# Patient Record
Sex: MALE | Race: WHITE | Employment: FULL TIME | URBAN - NONMETROPOLITAN AREA
[De-identification: names, ages, dates, MRNs, and addresses within clinical notes are randomized per-mention and may not be internally consistent; named-entity substitution may affect disease eponyms.]

---

## 2019-08-20 ENCOUNTER — OFFICE VISIT (OUTPATIENT)
Dept: FAMILY MEDICINE CLINIC | Age: 69
End: 2019-08-20
Payer: COMMERCIAL

## 2019-08-20 VITALS
SYSTOLIC BLOOD PRESSURE: 130 MMHG | DIASTOLIC BLOOD PRESSURE: 74 MMHG | HEART RATE: 88 BPM | BODY MASS INDEX: 23.79 KG/M2 | RESPIRATION RATE: 20 BRPM | TEMPERATURE: 97.8 F | WEIGHT: 157 LBS | HEIGHT: 68 IN | OXYGEN SATURATION: 98 %

## 2019-08-20 DIAGNOSIS — Z00.00 ANNUAL PHYSICAL EXAM: ICD-10-CM

## 2019-08-20 DIAGNOSIS — F17.210 CIGARETTE NICOTINE DEPENDENCE, UNCOMPLICATED: ICD-10-CM

## 2019-08-20 DIAGNOSIS — R53.83 FATIGUE, UNSPECIFIED TYPE: ICD-10-CM

## 2019-08-20 DIAGNOSIS — R73.09 ELEVATED GLUCOSE: ICD-10-CM

## 2019-08-20 DIAGNOSIS — Z12.5 SCREENING FOR PROSTATE CANCER: ICD-10-CM

## 2019-08-20 DIAGNOSIS — N52.9 ERECTILE DYSFUNCTION, UNSPECIFIED ERECTILE DYSFUNCTION TYPE: ICD-10-CM

## 2019-08-20 DIAGNOSIS — K40.20 NON-RECURRENT BILATERAL INGUINAL HERNIA WITHOUT OBSTRUCTION OR GANGRENE: Primary | ICD-10-CM

## 2019-08-20 DIAGNOSIS — Z87.891 PERSONAL HISTORY OF TOBACCO USE: ICD-10-CM

## 2019-08-20 PROCEDURE — 4040F PNEUMOC VAC/ADMIN/RCVD: CPT | Performed by: NURSE PRACTITIONER

## 2019-08-20 PROCEDURE — G0296 VISIT TO DETERM LDCT ELIG: HCPCS | Performed by: NURSE PRACTITIONER

## 2019-08-20 PROCEDURE — G8427 DOCREV CUR MEDS BY ELIG CLIN: HCPCS | Performed by: NURSE PRACTITIONER

## 2019-08-20 PROCEDURE — G8420 CALC BMI NORM PARAMETERS: HCPCS | Performed by: NURSE PRACTITIONER

## 2019-08-20 PROCEDURE — 99214 OFFICE O/P EST MOD 30 MIN: CPT | Performed by: NURSE PRACTITIONER

## 2019-08-20 PROCEDURE — 4004F PT TOBACCO SCREEN RCVD TLK: CPT | Performed by: NURSE PRACTITIONER

## 2019-08-20 PROCEDURE — 3017F COLORECTAL CA SCREEN DOC REV: CPT | Performed by: NURSE PRACTITIONER

## 2019-08-20 PROCEDURE — 1123F ACP DISCUSS/DSCN MKR DOCD: CPT | Performed by: NURSE PRACTITIONER

## 2019-08-20 RX ORDER — SILDENAFIL CITRATE 20 MG/1
20 TABLET ORAL DAILY PRN
Qty: 30 TABLET | Refills: 5 | Status: SHIPPED | OUTPATIENT
Start: 2019-08-20 | End: 2019-09-19

## 2019-08-20 ASSESSMENT — PATIENT HEALTH QUESTIONNAIRE - PHQ9
2. FEELING DOWN, DEPRESSED OR HOPELESS: 0
SUM OF ALL RESPONSES TO PHQ9 QUESTIONS 1 & 2: 0
1. LITTLE INTEREST OR PLEASURE IN DOING THINGS: 0
SUM OF ALL RESPONSES TO PHQ QUESTIONS 1-9: 0
SUM OF ALL RESPONSES TO PHQ QUESTIONS 1-9: 0

## 2019-08-20 ASSESSMENT — ENCOUNTER SYMPTOMS
SHORTNESS OF BREATH: 0
TROUBLE SWALLOWING: 0
VOICE CHANGE: 0
BLOOD IN STOOL: 0
COUGH: 1
ABDOMINAL PAIN: 0

## 2019-08-21 DIAGNOSIS — Z12.5 SCREENING FOR PROSTATE CANCER: ICD-10-CM

## 2019-08-21 DIAGNOSIS — Z00.00 ANNUAL PHYSICAL EXAM: ICD-10-CM

## 2019-08-21 DIAGNOSIS — R53.83 FATIGUE, UNSPECIFIED TYPE: ICD-10-CM

## 2019-08-21 LAB
ALBUMIN SERPL-MCNC: 4.3 G/DL (ref 3.5–5.2)
ALP BLD-CCNC: 66 U/L (ref 40–130)
ALT SERPL-CCNC: 14 U/L (ref 5–41)
ANION GAP SERPL CALCULATED.3IONS-SCNC: 14 MMOL/L (ref 7–19)
AST SERPL-CCNC: 19 U/L (ref 5–40)
BASOPHILS ABSOLUTE: 0.1 K/UL (ref 0–0.2)
BASOPHILS RELATIVE PERCENT: 1 % (ref 0–1)
BILIRUB SERPL-MCNC: 0.4 MG/DL (ref 0.2–1.2)
BUN BLDV-MCNC: 13 MG/DL (ref 8–23)
CALCIUM SERPL-MCNC: 10.9 MG/DL (ref 8.8–10.2)
CHLORIDE BLD-SCNC: 105 MMOL/L (ref 98–111)
CHOLESTEROL, TOTAL: 196 MG/DL (ref 160–199)
CO2: 20 MMOL/L (ref 22–29)
CREAT SERPL-MCNC: 0.9 MG/DL (ref 0.5–1.2)
EOSINOPHILS ABSOLUTE: 0.1 K/UL (ref 0–0.6)
EOSINOPHILS RELATIVE PERCENT: 2.2 % (ref 0–5)
GFR NON-AFRICAN AMERICAN: >60
GLUCOSE BLD-MCNC: 122 MG/DL (ref 74–109)
HCT VFR BLD CALC: 45.2 % (ref 42–52)
HDLC SERPL-MCNC: 41 MG/DL (ref 55–121)
HEMOGLOBIN: 14.6 G/DL (ref 14–18)
IMMATURE GRANULOCYTES #: 0 K/UL
LDL CHOLESTEROL CALCULATED: 136 MG/DL
LYMPHOCYTES ABSOLUTE: 1.6 K/UL (ref 1.1–4.5)
LYMPHOCYTES RELATIVE PERCENT: 25.2 % (ref 20–40)
MCH RBC QN AUTO: 30.8 PG (ref 27–31)
MCHC RBC AUTO-ENTMCNC: 32.3 G/DL (ref 33–37)
MCV RBC AUTO: 95.4 FL (ref 80–94)
MONOCYTES ABSOLUTE: 0.6 K/UL (ref 0–0.9)
MONOCYTES RELATIVE PERCENT: 8.9 % (ref 0–10)
NEUTROPHILS ABSOLUTE: 3.9 K/UL (ref 1.5–7.5)
NEUTROPHILS RELATIVE PERCENT: 62.5 % (ref 50–65)
PDW BLD-RTO: 13.9 % (ref 11.5–14.5)
PLATELET # BLD: 202 K/UL (ref 130–400)
PMV BLD AUTO: 9.6 FL (ref 9.4–12.4)
POTASSIUM SERPL-SCNC: 4.2 MMOL/L (ref 3.5–5)
PROSTATE SPECIFIC ANTIGEN: 3.37 NG/ML (ref 0–4)
RBC # BLD: 4.74 M/UL (ref 4.7–6.1)
SODIUM BLD-SCNC: 139 MMOL/L (ref 136–145)
T4 FREE: 1.3 NG/DL (ref 0.9–1.7)
TOTAL PROTEIN: 7.3 G/DL (ref 6.6–8.7)
TRIGL SERPL-MCNC: 93 MG/DL (ref 0–149)
TSH SERPL DL<=0.05 MIU/L-ACNC: 1.03 UIU/ML (ref 0.27–4.2)
WBC # BLD: 6.3 K/UL (ref 4.8–10.8)

## 2019-09-03 ENCOUNTER — OFFICE VISIT (OUTPATIENT)
Dept: SURGERY | Age: 69
End: 2019-09-03
Payer: MEDICARE

## 2019-09-03 VITALS
TEMPERATURE: 97.2 F | SYSTOLIC BLOOD PRESSURE: 130 MMHG | HEIGHT: 68 IN | BODY MASS INDEX: 23.52 KG/M2 | DIASTOLIC BLOOD PRESSURE: 78 MMHG | WEIGHT: 155.2 LBS

## 2019-09-03 DIAGNOSIS — K40.20 NON-RECURRENT BILATERAL INGUINAL HERNIA WITHOUT OBSTRUCTION OR GANGRENE: Primary | ICD-10-CM

## 2019-09-03 PROCEDURE — G8427 DOCREV CUR MEDS BY ELIG CLIN: HCPCS | Performed by: SURGERY

## 2019-09-03 PROCEDURE — G8420 CALC BMI NORM PARAMETERS: HCPCS | Performed by: SURGERY

## 2019-09-03 PROCEDURE — 4004F PT TOBACCO SCREEN RCVD TLK: CPT | Performed by: SURGERY

## 2019-09-03 PROCEDURE — 4040F PNEUMOC VAC/ADMIN/RCVD: CPT | Performed by: SURGERY

## 2019-09-03 PROCEDURE — 1123F ACP DISCUSS/DSCN MKR DOCD: CPT | Performed by: SURGERY

## 2019-09-03 PROCEDURE — 99202 OFFICE O/P NEW SF 15 MIN: CPT | Performed by: SURGERY

## 2019-09-03 PROCEDURE — 3017F COLORECTAL CA SCREEN DOC REV: CPT | Performed by: SURGERY

## 2019-09-05 ENCOUNTER — PREP FOR PROCEDURE (OUTPATIENT)
Dept: SURGERY | Age: 69
End: 2019-09-05

## 2019-09-05 ENCOUNTER — HOSPITAL ENCOUNTER (OUTPATIENT)
Dept: GENERAL RADIOLOGY | Age: 69
Discharge: HOME | End: 2019-09-05
Payer: COMMERCIAL

## 2019-09-05 ENCOUNTER — ANESTHESIA EVENT (OUTPATIENT)
Dept: OPERATING ROOM | Age: 69
End: 2019-09-05

## 2019-09-05 DIAGNOSIS — Z87.891 PERSONAL HISTORY OF TOBACCO USE: ICD-10-CM

## 2019-09-05 PROCEDURE — G0297 LDCT FOR LUNG CA SCREEN: HCPCS

## 2019-09-05 RX ORDER — SODIUM CHLORIDE 0.9 % (FLUSH) 0.9 %
10 SYRINGE (ML) INJECTION PRN
Status: CANCELLED | OUTPATIENT
Start: 2019-09-05

## 2019-09-05 RX ORDER — SODIUM CHLORIDE 0.9 % (FLUSH) 0.9 %
10 SYRINGE (ML) INJECTION EVERY 12 HOURS SCHEDULED
Status: CANCELLED | OUTPATIENT
Start: 2019-09-05

## 2019-09-05 RX ORDER — SODIUM CHLORIDE, SODIUM LACTATE, POTASSIUM CHLORIDE, CALCIUM CHLORIDE 600; 310; 30; 20 MG/100ML; MG/100ML; MG/100ML; MG/100ML
INJECTION, SOLUTION INTRAVENOUS CONTINUOUS
Status: CANCELLED | OUTPATIENT
Start: 2019-09-05

## 2019-09-05 ASSESSMENT — ENCOUNTER SYMPTOMS
NAUSEA: 0
SORE THROAT: 0
TROUBLE SWALLOWING: 0
ABDOMINAL DISTENTION: 0
SINUS PRESSURE: 0
WHEEZING: 0
VOMITING: 0
SHORTNESS OF BREATH: 0
BACK PAIN: 0
COLOR CHANGE: 0
COUGH: 1
CONSTIPATION: 0
CHEST TIGHTNESS: 0
ABDOMINAL PAIN: 0
DIARRHEA: 0

## 2019-09-11 ENCOUNTER — HOSPITAL ENCOUNTER (OUTPATIENT)
Age: 69
Setting detail: OUTPATIENT SURGERY
Discharge: HOME | End: 2019-09-11
Attending: SURGERY | Admitting: SURGERY
Payer: MEDICARE

## 2019-09-11 ENCOUNTER — ANESTHESIA (OUTPATIENT)
Dept: OPERATING ROOM | Age: 69
End: 2019-09-11

## 2019-09-11 VITALS
SYSTOLIC BLOOD PRESSURE: 134 MMHG | WEIGHT: 155 LBS | HEART RATE: 72 BPM | BODY MASS INDEX: 23.49 KG/M2 | OXYGEN SATURATION: 92 % | DIASTOLIC BLOOD PRESSURE: 72 MMHG | TEMPERATURE: 97.5 F | RESPIRATION RATE: 18 BRPM | HEIGHT: 68 IN

## 2019-09-11 VITALS
OXYGEN SATURATION: 95 % | SYSTOLIC BLOOD PRESSURE: 145 MMHG | DIASTOLIC BLOOD PRESSURE: 82 MMHG | RESPIRATION RATE: 6 BRPM

## 2019-09-11 DIAGNOSIS — K40.20 NON-RECURRENT BILATERAL INGUINAL HERNIA WITHOUT OBSTRUCTION OR GANGRENE: Primary | ICD-10-CM

## 2019-09-11 PROCEDURE — 49650 LAP ING HERNIA REPAIR INIT: CPT | Performed by: SURGERY

## 2019-09-11 PROCEDURE — G8918 PT W/O PREOP ORDER IV AB PRO: HCPCS

## 2019-09-11 PROCEDURE — G8916 PT W IV AB GIVEN ON TIME: HCPCS

## 2019-09-11 PROCEDURE — G8907 PT DOC NO EVENTS ON DISCHARG: HCPCS

## 2019-09-11 PROCEDURE — 49650 LAP ING HERNIA REPAIR INIT: CPT

## 2019-09-11 PROCEDURE — C1781 MESH (IMPLANTABLE): HCPCS | Performed by: SURGERY

## 2019-09-11 DEVICE — MESH HERN 15X15CM MACROPOROUS ULTRAPRO ADV: Type: IMPLANTABLE DEVICE | Site: ABDOMEN | Status: FUNCTIONAL

## 2019-09-11 RX ORDER — OXYCODONE HYDROCHLORIDE AND ACETAMINOPHEN 5; 325 MG/1; MG/1
1 TABLET ORAL PRN
Status: DISCONTINUED | OUTPATIENT
Start: 2019-09-11 | End: 2019-09-11 | Stop reason: HOSPADM

## 2019-09-11 RX ORDER — SODIUM CHLORIDE, SODIUM LACTATE, POTASSIUM CHLORIDE, CALCIUM CHLORIDE 600; 310; 30; 20 MG/100ML; MG/100ML; MG/100ML; MG/100ML
INJECTION, SOLUTION INTRAVENOUS CONTINUOUS
Status: DISCONTINUED | OUTPATIENT
Start: 2019-09-11 | End: 2019-09-11 | Stop reason: HOSPADM

## 2019-09-11 RX ORDER — OXYCODONE HYDROCHLORIDE AND ACETAMINOPHEN 5; 325 MG/1; MG/1
2 TABLET ORAL PRN
Status: DISCONTINUED | OUTPATIENT
Start: 2019-09-11 | End: 2019-09-11 | Stop reason: HOSPADM

## 2019-09-11 RX ORDER — HYDROCODONE BITARTRATE AND ACETAMINOPHEN 5; 325 MG/1; MG/1
1 TABLET ORAL EVERY 4 HOURS PRN
Qty: 25 TABLET | Refills: 0 | Status: SHIPPED | OUTPATIENT
Start: 2019-09-11 | End: 2019-09-14

## 2019-09-11 RX ORDER — BUPIVACAINE HYDROCHLORIDE 2.5 MG/ML
INJECTION, SOLUTION EPIDURAL; INFILTRATION; INTRACAUDAL PRN
Status: DISCONTINUED | OUTPATIENT
Start: 2019-09-11 | End: 2019-09-11 | Stop reason: ALTCHOICE

## 2019-09-11 RX ORDER — PROMETHAZINE HYDROCHLORIDE 25 MG/ML
12.5 INJECTION, SOLUTION INTRAMUSCULAR; INTRAVENOUS
Status: DISCONTINUED | OUTPATIENT
Start: 2019-09-11 | End: 2019-09-11 | Stop reason: HOSPADM

## 2019-09-11 RX ORDER — HYDROMORPHONE HCL 110MG/55ML
PATIENT CONTROLLED ANALGESIA SYRINGE INTRAVENOUS PRN
Status: DISCONTINUED | OUTPATIENT
Start: 2019-09-11 | End: 2019-09-11 | Stop reason: SDUPTHER

## 2019-09-11 RX ORDER — HYDROMORPHONE HCL 110MG/55ML
0.25 PATIENT CONTROLLED ANALGESIA SYRINGE INTRAVENOUS EVERY 5 MIN PRN
Status: DISCONTINUED | OUTPATIENT
Start: 2019-09-11 | End: 2019-09-11 | Stop reason: HOSPADM

## 2019-09-11 RX ORDER — SODIUM CHLORIDE 0.9 % (FLUSH) 0.9 %
10 SYRINGE (ML) INJECTION PRN
Status: DISCONTINUED | OUTPATIENT
Start: 2019-09-11 | End: 2019-09-11 | Stop reason: HOSPADM

## 2019-09-11 RX ORDER — HYDRALAZINE HYDROCHLORIDE 20 MG/ML
5 INJECTION INTRAMUSCULAR; INTRAVENOUS EVERY 10 MIN PRN
Status: DISCONTINUED | OUTPATIENT
Start: 2019-09-11 | End: 2019-09-11 | Stop reason: HOSPADM

## 2019-09-11 RX ORDER — DEXAMETHASONE SODIUM PHOSPHATE 4 MG/ML
INJECTION, SOLUTION INTRA-ARTICULAR; INTRALESIONAL; INTRAMUSCULAR; INTRAVENOUS; SOFT TISSUE PRN
Status: DISCONTINUED | OUTPATIENT
Start: 2019-09-11 | End: 2019-09-11 | Stop reason: SDUPTHER

## 2019-09-11 RX ORDER — ONDANSETRON 2 MG/ML
4 INJECTION INTRAMUSCULAR; INTRAVENOUS
Status: DISCONTINUED | OUTPATIENT
Start: 2019-09-11 | End: 2019-09-11 | Stop reason: HOSPADM

## 2019-09-11 RX ORDER — ONDANSETRON 2 MG/ML
INJECTION INTRAMUSCULAR; INTRAVENOUS PRN
Status: DISCONTINUED | OUTPATIENT
Start: 2019-09-11 | End: 2019-09-11 | Stop reason: SDUPTHER

## 2019-09-11 RX ORDER — HYDROMORPHONE HCL 110MG/55ML
0.5 PATIENT CONTROLLED ANALGESIA SYRINGE INTRAVENOUS EVERY 5 MIN PRN
Status: DISCONTINUED | OUTPATIENT
Start: 2019-09-11 | End: 2019-09-11 | Stop reason: HOSPADM

## 2019-09-11 RX ORDER — HYDROCODONE BITARTRATE AND ACETAMINOPHEN 5; 325 MG/1; MG/1
2 TABLET ORAL PRN
Status: COMPLETED | OUTPATIENT
Start: 2019-09-11 | End: 2019-09-11

## 2019-09-11 RX ORDER — FENTANYL CITRATE 50 UG/ML
50 INJECTION, SOLUTION INTRAMUSCULAR; INTRAVENOUS EVERY 5 MIN PRN
Status: DISCONTINUED | OUTPATIENT
Start: 2019-09-11 | End: 2019-09-11 | Stop reason: HOSPADM

## 2019-09-11 RX ORDER — LABETALOL HYDROCHLORIDE 5 MG/ML
5 INJECTION, SOLUTION INTRAVENOUS EVERY 10 MIN PRN
Status: DISCONTINUED | OUTPATIENT
Start: 2019-09-11 | End: 2019-09-11 | Stop reason: HOSPADM

## 2019-09-11 RX ORDER — LIDOCAINE HYDROCHLORIDE 10 MG/ML
INJECTION, SOLUTION INFILTRATION; PERINEURAL PRN
Status: DISCONTINUED | OUTPATIENT
Start: 2019-09-11 | End: 2019-09-11 | Stop reason: SDUPTHER

## 2019-09-11 RX ORDER — MEPERIDINE HYDROCHLORIDE 25 MG/ML
12.5 INJECTION INTRAMUSCULAR; INTRAVENOUS; SUBCUTANEOUS EVERY 5 MIN PRN
Status: DISCONTINUED | OUTPATIENT
Start: 2019-09-11 | End: 2019-09-11 | Stop reason: HOSPADM

## 2019-09-11 RX ORDER — FENTANYL CITRATE 50 UG/ML
INJECTION, SOLUTION INTRAMUSCULAR; INTRAVENOUS PRN
Status: DISCONTINUED | OUTPATIENT
Start: 2019-09-11 | End: 2019-09-11 | Stop reason: SDUPTHER

## 2019-09-11 RX ORDER — HYDROCODONE BITARTRATE AND ACETAMINOPHEN 5; 325 MG/1; MG/1
1 TABLET ORAL PRN
Status: COMPLETED | OUTPATIENT
Start: 2019-09-11 | End: 2019-09-11

## 2019-09-11 RX ORDER — DIPHENHYDRAMINE HYDROCHLORIDE 50 MG/ML
12.5 INJECTION INTRAMUSCULAR; INTRAVENOUS
Status: DISCONTINUED | OUTPATIENT
Start: 2019-09-11 | End: 2019-09-11 | Stop reason: HOSPADM

## 2019-09-11 RX ORDER — SODIUM CHLORIDE 0.9 % (FLUSH) 0.9 %
10 SYRINGE (ML) INJECTION EVERY 12 HOURS SCHEDULED
Status: DISCONTINUED | OUTPATIENT
Start: 2019-09-11 | End: 2019-09-11 | Stop reason: HOSPADM

## 2019-09-11 RX ORDER — ROCURONIUM BROMIDE 10 MG/ML
INJECTION, SOLUTION INTRAVENOUS PRN
Status: DISCONTINUED | OUTPATIENT
Start: 2019-09-11 | End: 2019-09-11 | Stop reason: SDUPTHER

## 2019-09-11 RX ORDER — KETOROLAC TROMETHAMINE 30 MG/ML
INJECTION, SOLUTION INTRAMUSCULAR; INTRAVENOUS PRN
Status: DISCONTINUED | OUTPATIENT
Start: 2019-09-11 | End: 2019-09-11 | Stop reason: SDUPTHER

## 2019-09-11 RX ORDER — PROPOFOL 10 MG/ML
INJECTION, EMULSION INTRAVENOUS PRN
Status: DISCONTINUED | OUTPATIENT
Start: 2019-09-11 | End: 2019-09-11 | Stop reason: SDUPTHER

## 2019-09-11 RX ADMIN — LIDOCAINE HYDROCHLORIDE 40 MG: 10 INJECTION, SOLUTION INFILTRATION; PERINEURAL at 12:01

## 2019-09-11 RX ADMIN — PROPOFOL 100 MG: 10 INJECTION, EMULSION INTRAVENOUS at 12:02

## 2019-09-11 RX ADMIN — KETOROLAC TROMETHAMINE 30 MG: 30 INJECTION, SOLUTION INTRAMUSCULAR; INTRAVENOUS at 13:16

## 2019-09-11 RX ADMIN — SODIUM CHLORIDE, SODIUM LACTATE, POTASSIUM CHLORIDE, CALCIUM CHLORIDE: 600; 310; 30; 20 INJECTION, SOLUTION INTRAVENOUS at 09:26

## 2019-09-11 RX ADMIN — ROCURONIUM BROMIDE 40 MG: 10 INJECTION, SOLUTION INTRAVENOUS at 12:02

## 2019-09-11 RX ADMIN — DEXAMETHASONE SODIUM PHOSPHATE 4 MG: 4 INJECTION, SOLUTION INTRA-ARTICULAR; INTRALESIONAL; INTRAMUSCULAR; INTRAVENOUS; SOFT TISSUE at 12:39

## 2019-09-11 RX ADMIN — FENTANYL CITRATE 50 MCG: 50 INJECTION, SOLUTION INTRAMUSCULAR; INTRAVENOUS at 12:06

## 2019-09-11 RX ADMIN — KETOROLAC TROMETHAMINE 30 MG: 30 INJECTION, SOLUTION INTRAMUSCULAR; INTRAVENOUS at 13:04

## 2019-09-11 RX ADMIN — HYDROCODONE BITARTRATE AND ACETAMINOPHEN 1 TABLET: 5; 325 TABLET ORAL at 14:15

## 2019-09-11 RX ADMIN — SODIUM CHLORIDE, SODIUM LACTATE, POTASSIUM CHLORIDE, CALCIUM CHLORIDE: 600; 310; 30; 20 INJECTION, SOLUTION INTRAVENOUS at 13:10

## 2019-09-11 RX ADMIN — ONDANSETRON 4 MG: 2 INJECTION INTRAMUSCULAR; INTRAVENOUS at 12:39

## 2019-09-11 RX ADMIN — FENTANYL CITRATE 50 MCG: 50 INJECTION, SOLUTION INTRAMUSCULAR; INTRAVENOUS at 12:37

## 2019-09-11 RX ADMIN — Medication 0.25 MG: at 13:21

## 2019-09-11 RX ADMIN — PROPOFOL 80 MG: 10 INJECTION, EMULSION INTRAVENOUS at 12:01

## 2019-09-11 ASSESSMENT — PAIN DESCRIPTION - LOCATION: LOCATION: ABDOMEN

## 2019-09-11 ASSESSMENT — PAIN DESCRIPTION - DESCRIPTORS: DESCRIPTORS: SHARP

## 2019-09-11 ASSESSMENT — PAIN DESCRIPTION - ORIENTATION: ORIENTATION: LOWER

## 2019-09-11 ASSESSMENT — PAIN SCALES - GENERAL
PAINLEVEL_OUTOF10: 4
PAINLEVEL_OUTOF10: 8
PAINLEVEL_OUTOF10: 1
PAINLEVEL_OUTOF10: 1

## 2019-09-11 ASSESSMENT — PAIN DESCRIPTION - PAIN TYPE: TYPE: SURGICAL PAIN

## 2019-09-11 ASSESSMENT — PAIN DESCRIPTION - PROGRESSION: CLINICAL_PROGRESSION: GRADUALLY IMPROVING

## 2019-09-11 ASSESSMENT — PAIN - FUNCTIONAL ASSESSMENT: PAIN_FUNCTIONAL_ASSESSMENT: ACTIVITIES ARE NOT PREVENTED

## 2019-09-11 ASSESSMENT — LIFESTYLE VARIABLES: SMOKING_STATUS: 1

## 2019-09-11 NOTE — ANESTHESIA POSTPROCEDURE EVALUATION
Department of Anesthesiology  Postprocedure Note    Patient: Val Wright  MRN: [id number]  YOB: 1950  Date of evaluation: 9/11/2019  Time:  12:43 PM     Procedure Summary     Date:  09/11/19 Room / Location:  [facility] OR  / [facility] OR    Anesthesia Start:  9642 Anesthesia Stop:      Procedure:  LAPAROSCOPIC BILATERAL INGUINAL HERNIA REPAIR WITH MESH (Bilateral Abdomen) Diagnosis:  (BILATERAL INGUINAL HERNIAS)    Surgeon:  Maxine Brush MD Responsible Provider: LIMA Gates CRNA    Anesthesia Type:  general ASA Status:  2          Anesthesia Type: general    Enzo Phase I:      Enzo Phase II:      Last vitals: Reviewed and per EMR flowsheets.        Anesthesia Post Evaluation    Patient location during evaluation: PACU  Patient participation: complete - patient participated  Level of consciousness: responsive to verbal stimuli  Airway patency: patent  Nausea & Vomiting: no nausea  Complications: no  Cardiovascular status: blood pressure returned to baseline  Respiratory status: face mask and spontaneous ventilation  Hydration status: euvolemic

## 2019-09-11 NOTE — OP NOTE
inspected with a large direct inguinal hernia noted. I went out laterally and took down some filmy attachments and identified the  peritoneum as it ran onto the cord, where it stopped before forming an   indirect sac. The peritoneum was elevated from the underlying cord   structures and dissected well back in the retroperitoneum. I then approached the left side and in similar fashion, Medardo's ligament was  cleared and the direct space inspected and found to contain a medium sized  hernia. We went out laterally and took down filmy attachments and again  identified the peritoneum as it ran onto the cord, where it again stop short   of forming an indirect sac. This was again dissected from the underlying   structures and the dissection carried well back in the retroperitoneum. I elected a repair using Ultrapro mesh. A 6 x 6 inch piece was delivered onto  the field and cut into two 3 x 6 inch pieces. The first was wrapped around  the [facility] and advanced through the camera port. It was then  grabbed in the pre-peritoneal space, opened and oriented across the left  groin in standard fashion. When I was satisfied with the positioning, I  secured it in place with the [address]. The initial tack was taken  at the medial superior corner. I then tacked along the superior edge, taking  care to avoid the inferior epigastric vessels and terminated tacking at the  lateral superior corner. I then placed additional tacks along Medardo's ligament  down to the level of the obturator vein. This left the mesh in excellent  position with the direct and indirect spaces nicely covered. I brought the second piece of mesh into the abdomen and oriented it across  the right groin. I secured it in similar fashion to that described for the  left. I was again quite satisfied with coverage over the direct and indirect  spaces. The operative site was then checked and hemostasis assured.  We vented CO2  and watched as the peritoneum descended back into the pelvis, coming to lie  On top of the mesh in appropriate fashion. The working ports were then removed. No bleeding noted. The structural port  was deflated and removed as well. Fascia at the umbilical incision was  closed with two figure-of-eight 0 Vicryl sutures. Skin incisions were closed  with interrupted 3-0 Vicryl subcuticular suture, followed by Dermabond  dressing. Sponge, needle and instrument count correct on 2 occasions. Estimated intraoperative blood loss minimal.     Mr. Roberto Laboy tolerated his surgery well, and he was taken to recovery in satisfactory   condition.            ________________________________  Gonzalo Wray MD

## 2019-09-11 NOTE — DISCHARGE INSTR - ACTIVITY
Up as desired. Gradually resume light activity. 10 pound limit on lifting. May shower or bathe as desired. May resume driving when no longer taking narcotic pain medications.

## 2019-09-11 NOTE — INTERVAL H&P NOTE
H&P Update    Patient's History and Physical was reviewed. Patient examined. There has been no change.     Electronically signed by Davy Brewer MD on 9/11/19 at 11:41 AM

## 2019-09-11 NOTE — DISCHARGE INSTR - DIET
 Good nutrition is important when healing from an illness, injury, or surgery. Follow any nutrition recommendations given to you during your hospital stay. You may eat a regular diet today but you want to start with non spicy soft foods and advance as tolerated. Drink lots of fluid today to rehydrate yourself (anything non alcoholic will do but water is always best.)  If you get nauseated while eating stop with food and continue with fluid for a while. Resume your usual diet as desired.

## 2019-09-18 ENCOUNTER — TELEPHONE (OUTPATIENT)
Dept: SURGERY | Age: 69
End: 2019-09-18

## 2019-09-18 NOTE — TELEPHONE ENCOUNTER
Called patient. ..he was at work. He states he has a bulge like he had before surgery. Appointment made for patient to see upad tomorrow for concerns.

## 2019-09-19 ENCOUNTER — OFFICE VISIT (OUTPATIENT)
Dept: SURGERY | Age: 69
End: 2019-09-19

## 2019-09-19 VITALS
DIASTOLIC BLOOD PRESSURE: 70 MMHG | HEIGHT: 68 IN | WEIGHT: 155 LBS | BODY MASS INDEX: 23.49 KG/M2 | SYSTOLIC BLOOD PRESSURE: 106 MMHG

## 2019-09-19 DIAGNOSIS — Z87.19 S/P HERNIA SURGERY: Primary | ICD-10-CM

## 2019-09-19 DIAGNOSIS — Z98.890 S/P HERNIA SURGERY: Primary | ICD-10-CM

## 2019-09-19 PROCEDURE — 99024 POSTOP FOLLOW-UP VISIT: CPT | Performed by: PHYSICIAN ASSISTANT

## 2019-10-03 ENCOUNTER — OFFICE VISIT (OUTPATIENT)
Dept: SURGERY | Age: 69
End: 2019-10-03

## 2019-10-03 VITALS
DIASTOLIC BLOOD PRESSURE: 74 MMHG | WEIGHT: 154.4 LBS | HEIGHT: 68 IN | BODY MASS INDEX: 23.4 KG/M2 | SYSTOLIC BLOOD PRESSURE: 124 MMHG | TEMPERATURE: 97.8 F

## 2019-10-03 DIAGNOSIS — Z09 S/P INGUINAL HERNIA REPAIR, FOLLOW-UP EXAM: Primary | ICD-10-CM

## 2019-10-03 PROCEDURE — 99024 POSTOP FOLLOW-UP VISIT: CPT | Performed by: SURGERY

## 2019-12-05 ENCOUNTER — OFFICE VISIT (OUTPATIENT)
Dept: SURGERY | Age: 69
End: 2019-12-05

## 2019-12-05 VITALS
DIASTOLIC BLOOD PRESSURE: 74 MMHG | WEIGHT: 155 LBS | TEMPERATURE: 97.2 F | BODY MASS INDEX: 23.49 KG/M2 | SYSTOLIC BLOOD PRESSURE: 120 MMHG | HEIGHT: 68 IN

## 2019-12-05 DIAGNOSIS — Z09 S/P INGUINAL HERNIA REPAIR, FOLLOW-UP EXAM: Primary | ICD-10-CM

## 2019-12-05 PROCEDURE — 99024 POSTOP FOLLOW-UP VISIT: CPT | Performed by: SURGERY

## 2020-05-05 ENCOUNTER — TELEPHONE (OUTPATIENT)
Dept: FAMILY MEDICINE CLINIC | Age: 70
End: 2020-05-05

## 2020-12-14 ENCOUNTER — NURSE TRIAGE (OUTPATIENT)
Dept: OTHER | Facility: CLINIC | Age: 70
End: 2020-12-14

## 2020-12-14 ENCOUNTER — VIRTUAL VISIT (OUTPATIENT)
Dept: FAMILY MEDICINE CLINIC | Age: 70
End: 2020-12-14
Payer: COMMERCIAL

## 2020-12-14 PROCEDURE — 4004F PT TOBACCO SCREEN RCVD TLK: CPT | Performed by: NURSE PRACTITIONER

## 2020-12-14 PROCEDURE — 4040F PNEUMOC VAC/ADMIN/RCVD: CPT | Performed by: NURSE PRACTITIONER

## 2020-12-14 PROCEDURE — G8421 BMI NOT CALCULATED: HCPCS | Performed by: NURSE PRACTITIONER

## 2020-12-14 PROCEDURE — 1123F ACP DISCUSS/DSCN MKR DOCD: CPT | Performed by: NURSE PRACTITIONER

## 2020-12-14 PROCEDURE — G8428 CUR MEDS NOT DOCUMENT: HCPCS | Performed by: NURSE PRACTITIONER

## 2020-12-14 PROCEDURE — G8484 FLU IMMUNIZE NO ADMIN: HCPCS | Performed by: NURSE PRACTITIONER

## 2020-12-14 PROCEDURE — 3017F COLORECTAL CA SCREEN DOC REV: CPT | Performed by: NURSE PRACTITIONER

## 2020-12-14 PROCEDURE — 99213 OFFICE O/P EST LOW 20 MIN: CPT | Performed by: NURSE PRACTITIONER

## 2020-12-14 RX ORDER — METHYLPREDNISOLONE 4 MG/1
TABLET ORAL
Qty: 1 KIT | Refills: 0 | Status: SHIPPED | OUTPATIENT
Start: 2020-12-14 | End: 2020-12-20

## 2020-12-14 ASSESSMENT — ENCOUNTER SYMPTOMS: PHOTOPHOBIA: 0

## 2020-12-14 NOTE — TELEPHONE ENCOUNTER
Neck pain started about 10 days ago. Had pain a couple years ago that required therapy. Reason for Disposition   Numbness in an arm or hand (i.e., loss of sensation)    Answer Assessment - Initial Assessment Questions  1. ONSET: \"When did the pain begin? \"       See above    2. LOCATION: \"Where does it hurt? \"       Right side of neck down right shoulder    3. PATTERN \"Does the pain come and go, or has it been constant since it started? \"       Comes and goes. Better in mornings and worse as day goes on.    4. SEVERITY: \"How bad is the pain? \"  (Scale 1-10; or mild, moderate, severe)    - MILD (1-3): doesn't interfere with normal activities     - MODERATE (4-7): interferes with normal activities or awakens from sleep     - SEVERE (8-10):  excruciating pain, unable to do any normal activities       2/10 right now. 10/10 at its worse    5. RADIATION: \"Does the pain go anywhere else, shoot into your arms? \"      See above    6. CORD SYMPTOMS: \"Any weakness or numbness of the arms or legs? \"      Weakness and numbness down both arms when he tilts his head back     7. CAUSE: \"What do you think is causing the neck pain? \"      History of neck issues    8. NECK OVERUSE: Milly Mable recent activities that involved turning or twisting the neck? \"      Overused neck last weekend getting Nikole items out of attic    9. OTHER SYMPTOMS: \"Do you have any other symptoms? \" (e.g., headache, fever, chest pain, difficulty breathing, neck swelling)      See above    10. PREGNANCY: \"Is there any chance you are pregnant? \" \"When was your last menstrual period? \"        NA    Protocols used: NECK PAIN OR STIFFNESS-ADULT-[place]    Patient called pre-service center [facility]) to schedule appointment, with red flag complaint, transferred to RN access for triage. See above questions and answers. Caller talking full sentences without any distress on phone. Discussed disposition and patient agreeable.   Discussed potential consequences for not following disposition recommendation. Care advice given and aware to call back with any concerns or persistent, worsening, or new symptoms develop. Warm transfer to [facility] scheduling for appointment. Attention Provider: Thank you for allowing me to participate in the care of your patient. The  patient was connected to triage in response to information provided to the [facility]. Please do not respond through this encounter as the response is not directed to a shared pool.

## 2020-12-14 NOTE — PROGRESS NOTES
2020    TELEHEALTH EVALUATION -- Audio/Visual (During URIXF-24 public health emergency)    HPI:    Higinio Thomas (:  1950) has requested an audio/video evaluation for the following concern(s):    Neck pain with tilting head up arms feel numband tingl to hands. Has had neck pain in past had PT. Thinks strained neck getting arnol decorations. Using tylenol and whiskey to help sleeping. Pain mostly center of neck and down right arm. Has been doing the exercise. Review of Systems   Constitutional: Negative for fatigue and fever. Eyes: Negative for photophobia and visual disturbance. Musculoskeletal: Positive for neck pain and neck stiffness. Neurological: Positive for numbness. Negative for dizziness and tremors. Prior to Visit Medications    Medication Sig Taking?  Authorizing Provider   methylPREDNISolone (MEDROL DOSEPACK) 4 MG tablet Take by mouth. 5 days Take as direct Yes LIMA Mcadams   sildenafil (REVATIO) 20 MG tablet Take 1 tablet by mouth daily as needed (ED)  LIMA Mcadams   Multiple Vitamins-Minerals (MULTIVITAMIN & MINERAL PO) Take 1 tablet by mouth daily  Historical Provider, MD       Social History     Tobacco Use    Smoking status: Current Every Day Smoker     Packs/day: 1.00     Years: 50.00     Pack years: 50.00     Types: Cigarettes    Smokeless tobacco: Never Used   Substance Use Topics    Alcohol use: Yes     Comment: social    Drug use: No        No Known Allergies,   Past Medical History:   Diagnosis Date    ED (erectile dysfunction)    ,   Past Surgical History:   Procedure Laterality Date    COLONOSCOPY      HERNIA REPAIR Bilateral 2019    LAPAROSCOPIC BILATERAL INGUINAL HERNIA REPAIR WITH MESH performed by Jonathon Roberts MD at [facility] OR       PHYSICAL EXAMINATION:  [ INSTRUCTIONS:  \"[x]\" Indicates a positive item  \"[]\" Indicates a negative item  -- DELETE ALL ITEMS NOT EXAMINED] Vital Signs: (As obtained by patient/caregiver or practitioner observation)    Blood pressure-  Heart rate-    Respiratory rate-    Temperature-  Pulse oximetry-     Constitutional: [] Appears well-developed and well-nourished [] No apparent distress      [] Abnormal-   Mental status  [x] Alert and awake  [x] Oriented to person/place/time [x]Able to follow commands      Eyes:  EOM    [x]  Normal  [] Abnormal-  Sclera  [x]  Normal  [] Abnormal -         Discharge [x]  None visible  [] Abnormal -    HENT:   [x] Normocephalic, atraumatic. [] Abnormal   [x] Mouth/Throat: Mucous membranes are moist.     External Ears [x] Normal  [] Abnormal-     Neck: [x] No visualized mass     Pulmonary/Chest: [x] Respiratory effort normal.  [x] No visualized signs of difficulty breathing or respiratory distress        [] Abnormal-      Musculoskeletal:   [] Normal gait with no signs of ataxia         [x] Normal range of motion of neck        [] Abnormal-       Neurological:        [x] No Facial Asymmetry (Cranial nerve 7 motor function) (limited exam to video visit)          [x] No gaze palsy        [] Abnormal-         Skin:        [x] No significant exanthematous lesions or discoloration noted on facial skin         [] Abnormal-            Psychiatric:       [x] Normal Affect [x] No Hallucinations        [] Abnormal-     Other pertinent observable physical exam findings-     ASSESSMENT/PLAN:  1. Osteoarthritis of spine with radiculopathy, cervical region  - External Referral To Neurosurgery  - methylPREDNISolone (MEDROL DOSEPACK) 4 MG tablet; Take by mouth. 5 days Take as direct  Dispense: 1 kit; Refill: 0        No follow-ups on file. Santos Up is a 78 y.o. male being evaluated by a Virtual Visit (video visit) encounter to address concerns as mentioned above. A caregiver was present when appropriate. Due to this being a TeleHealth encounter (During [facility] public health emergency), evaluation of the following organ systems was limited: Vitals/Constitutional/EENT/Resp/CV/GI//MS/Neuro/Skin/Heme-Lymph-Imm. Pursuant to the emergency declaration under the [address] [place] [address] and the [employer] Act, this Virtual Visit was conducted with patient's (and/or legal guardian's) consent, to reduce the patient's risk of exposure to COVID-19 and provide necessary medical care. The patient (and/or legal guardian) has also been advised to contact this office for worsening conditions or problems, and seek emergency medical treatment and/or call 911 if deemed necessary. Patient identification was verified at the start of the visit: Yes    Total time spent on this encounter: 15 minutes    Services were provided through a video synchronous discussion virtually to substitute for in-person clinic visit. Patient and provider were located at their individual homes. --LIMA Potter on 12/14/2020 at 7:05 PM    An electronic signature was used to authenticate this note.

## 2020-12-23 ENCOUNTER — OFFICE VISIT (OUTPATIENT)
Dept: FAMILY MEDICINE CLINIC | Age: 70
End: 2020-12-23
Payer: COMMERCIAL

## 2020-12-23 VITALS
RESPIRATION RATE: 16 BRPM | TEMPERATURE: 97.5 F | DIASTOLIC BLOOD PRESSURE: 76 MMHG | OXYGEN SATURATION: 96 % | HEIGHT: 66 IN | HEART RATE: 102 BPM | BODY MASS INDEX: 24.27 KG/M2 | SYSTOLIC BLOOD PRESSURE: 124 MMHG | WEIGHT: 151 LBS

## 2020-12-23 PROCEDURE — 99213 OFFICE O/P EST LOW 20 MIN: CPT | Performed by: FAMILY MEDICINE

## 2020-12-23 PROCEDURE — 1123F ACP DISCUSS/DSCN MKR DOCD: CPT | Performed by: FAMILY MEDICINE

## 2020-12-23 PROCEDURE — 4004F PT TOBACCO SCREEN RCVD TLK: CPT | Performed by: FAMILY MEDICINE

## 2020-12-23 PROCEDURE — 4040F PNEUMOC VAC/ADMIN/RCVD: CPT | Performed by: FAMILY MEDICINE

## 2020-12-23 PROCEDURE — G8484 FLU IMMUNIZE NO ADMIN: HCPCS | Performed by: FAMILY MEDICINE

## 2020-12-23 PROCEDURE — G8427 DOCREV CUR MEDS BY ELIG CLIN: HCPCS | Performed by: FAMILY MEDICINE

## 2020-12-23 PROCEDURE — G8420 CALC BMI NORM PARAMETERS: HCPCS | Performed by: FAMILY MEDICINE

## 2020-12-23 PROCEDURE — 3017F COLORECTAL CA SCREEN DOC REV: CPT | Performed by: FAMILY MEDICINE

## 2020-12-23 RX ORDER — SULFAMETHOXAZOLE AND TRIMETHOPRIM 800; 160 MG/1; MG/1
1 TABLET ORAL 2 TIMES DAILY
Qty: 20 TABLET | Refills: 0 | Status: SHIPPED | OUTPATIENT
Start: 2020-12-23 | End: 2021-01-02

## 2020-12-23 NOTE — PROGRESS NOTES
Magaly Escobedo is a 78 y.o. male who presents today for   Chief Complaint   Patient presents with    Knee Pain     right knee    Leg Swelling       Chief Complaint: Knee pain and swelling    HPI  Patient reports that he has a history of a spot just below his right knee that will swell give him issues at times but recently he states that it became swollen and tender with redness and warmth. He states is been going on for the past couple days and denies any specific injury to the knee. He denies any specific aggravating or relieving factors for his symptoms. He denies any fever or other associated symptoms. Review of Systems   Constitutional: Negative for activity change, appetite change, fatigue and fever. HENT: Negative for congestion and rhinorrhea. Eyes: Negative for pain and discharge. Respiratory: Negative for cough and shortness of breath. Cardiovascular: Negative for chest pain and palpitations. Gastrointestinal: Negative for abdominal pain, constipation, diarrhea, nausea and vomiting. Endocrine: Negative for cold intolerance and heat intolerance. Genitourinary: Negative for dysuria and hematuria. Musculoskeletal: Negative for back pain, gait problem, myalgias and neck pain. Skin: Positive for rash and wound. Past Medical History:   Diagnosis Date    ED (erectile dysfunction)        Current Outpatient Medications   Medication Sig Dispense Refill    sulfamethoxazole-trimethoprim (BACTRIM DS;SEPTRA DS) 800-160 MG per tablet Take 1 tablet by mouth 2 times daily for 10 days 20 tablet 0    sildenafil (REVATIO) 20 MG tablet Take 1 tablet by mouth daily as needed (ED) 30 tablet 5    Multiple Vitamins-Minerals (MULTIVITAMIN & MINERAL PO) Take 1 tablet by mouth daily       No current facility-administered medications for this visit.          No Known Allergies    Past Surgical History:   Procedure Laterality Date    COLONOSCOPY      HERNIA REPAIR Bilateral 9/11/2019 LAPAROSCOPIC BILATERAL INGUINAL HERNIA REPAIR WITH MESH performed by Mecca Cobb MD at [address] OR       Social History     Tobacco Use    Smoking status: Current Every Day Smoker     Packs/day: 1.00     Years: 50.00     Pack years: 50.00     Types: Cigarettes    Smokeless tobacco: Never Used   Substance Use Topics    Alcohol use: Yes     Comment: social    Drug use: No       Family History   Problem Relation Age of Onset    Cancer Father         lung    Colon Cancer Neg Hx     Colon Polyps Neg Hx        /78 (Site: Right Upper Arm, Position: Sitting, Cuff Size: Medium Adult)   Pulse 102   Temp 97.5 °F (36.4 °C) (Oral)   Resp 16   Ht 5' 6\" (1.676 m)   Wt 151 lb (68.5 kg)   SpO2 96%   BMI 24.37 kg/m²     Physical Exam  Vitals signs and nursing note reviewed. Constitutional:       General: He is not in acute distress. Appearance: Normal appearance. He is well-developed. He is not diaphoretic. HENT:      Head: Normocephalic and atraumatic. Right Ear: External ear normal.      Left Ear: External ear normal.   Eyes:      General: No scleral icterus. Right eye: No discharge. Left eye: No discharge. Conjunctiva/sclera: Conjunctivae normal.   Neck:      Musculoskeletal: Normal range of motion and neck supple. No muscular tenderness. Trachea: No tracheal deviation. Cardiovascular:      Rate and Rhythm: Normal rate and regular rhythm. Heart sounds: Normal heart sounds. No murmur. No friction rub. No gallop. Pulmonary:      Effort: Pulmonary effort is normal. No respiratory distress. Breath sounds: Normal breath sounds. No wheezing or rales. Abdominal:      General: Bowel sounds are normal. There is no distension. Palpations: Abdomen is soft. Tenderness: There is no abdominal tenderness. Musculoskeletal:         General: No deformity (No gross deformities of upper or lower extremities) or signs of injury. Right lower leg: No edema. Left lower leg: No edema. Lymphadenopathy:      Cervical: No cervical adenopathy. Skin:     General: Skin is warm and dry. Findings: Erythema present. Comments: A few centimeters distal to patient's right knee there is an area of swelling and erythema that is notably warm to the touch. The area is nonfluctuant. Neurological:      Mental Status: He is alert and oriented to person, place, and time. Cranial Nerves: No cranial nerve deficit. Psychiatric:         Mood and Affect: Mood normal.         Behavior: Behavior normal.         Thought Content: Thought content normal.         Assessment:       ICD-10-CM    1. Cellulitis and abscess of lower extremity  L03.119 sulfamethoxazole-trimethoprim (BACTRIM DS;SEPTRA DS) 800-160 MG per tablet    L02.419        Plan:  Discussed potential diagnoses, expected course, and proper use of medication, including OTC medications. Discussed the concerns for infection due to the appearance of the involved area. Discussed alternative diagnoses which would be alternative treatments however in the setting of the concern for infection discussed that if it is indeed infection and we do not treat it as such that it could cause more significant problems and patient voiced understanding and agreement and he too is concerned about the possibility of the involved area being infection. Discussed that if the involved area continues to give him problems that follow-up would be indicated for further evaluation and further management. Discussed signs and symptoms requiring medical attention. All questions were answered and patient voiced understanding and agreement with plan as discussed. No orders of the defined types were placed in this encounter.     Orders Placed This Encounter   Medications    sulfamethoxazole-trimethoprim (BACTRIM DS;SEPTRA DS) 800-160 MG per tablet     Sig: Take 1 tablet by mouth 2 times daily for 10 days     Dispense:  20 tablet Refill:  0     There are no discontinued medications. Patient Instructions   Patient given educational handouts and has had all questions answered. Patient voices understanding and agrees to plans along with risks and benefits of plan. Patient is instructed to continue prior meds,diet, and exercise plans as instructed. Patient agrees to follow up as instructed and sooner if needed. Patient agrees to go to ER if condition becomes emergent. Return if symptoms worsen or fail to improve, for next scheduled follow up with PCP.

## 2020-12-30 ASSESSMENT — ENCOUNTER SYMPTOMS
ABDOMINAL PAIN: 0
EYE DISCHARGE: 0
NAUSEA: 0
EYE PAIN: 0
RHINORRHEA: 0
CONSTIPATION: 0
SHORTNESS OF BREATH: 0
COUGH: 0
VOMITING: 0
DIARRHEA: 0
BACK PAIN: 0

## 2020-12-31 NOTE — PATIENT INSTRUCTIONS
Patient Education        Cellulitis: Care Instructions  Your Care Instructions     Cellulitis is a skin infection caused by bacteria, most often strep or staph. It often occurs after a break in the skin from a scrape, cut, bite, or puncture, or after a rash. Cellulitis may be treated without doing tests to find out what caused it. But your doctor may do tests, if needed, to look for a specific bacteria, like methicillin-resistant Staphylococcus aureus (MRSA). The doctor has checked you carefully, but problems can develop later. If you notice any problems or new symptoms, get medical treatment right away. Follow-up care is a key part of your treatment and safety. Be sure to make and go to all appointments, and call your doctor if you are having problems. It's also a good idea to know your test results and keep a list of the medicines you take. How can you care for yourself at home? · Take your antibiotics as directed. Do not stop taking them just because you feel better. You need to take the full course of antibiotics. · Prop up the infected area on pillows to reduce pain and swelling. Try to keep the area above the level of your heart as often as you can. · If your doctor told you how to care for your wound, follow your doctor's instructions. If you did not get instructions, follow this general advice:  ? Wash the wound with clean water 2 times a day. Don't use hydrogen peroxide or alcohol, which can slow healing. ? You may cover the wound with a thin layer of petroleum jelly, such as Vaseline, and a nonstick bandage. ? Apply more petroleum jelly and replace the bandage as needed. · Be safe with medicines. Take pain medicines exactly as directed. ? If the doctor gave you a prescription medicine for pain, take it as prescribed. ? If you are not taking a prescription pain medicine, ask your doctor if you can take an over-the-counter medicine.   To prevent cellulitis in the future  · Try to prevent cuts, scrapes, or other injuries to your skin. Cellulitis most often occurs where there is a break in the skin. · If you get a scrape, cut, mild burn, or bite, wash the wound with clean water as soon as you can to help avoid infection. Don't use hydrogen peroxide or alcohol, which can slow healing. · If you have swelling in your legs (edema), support stockings and good skin care may help prevent leg sores and cellulitis. · Take care of your feet, especially if you have diabetes or other conditions that increase the risk of infection. Wear shoes and socks. Do not go barefoot. If you have athlete's foot or other skin problems on your feet, talk to your doctor about how to treat them. When should you call for help? Call your doctor now or seek immediate medical care if:    · You have signs that your infection is getting worse, such as:  ? Increased pain, swelling, warmth, or redness. ? Red streaks leading from the area. ? Pus draining from the area. ? A fever.     · You get a rash. Watch closely for changes in your health, and be sure to contact your doctor if:    · You do not get better as expected. Where can you learn more? Go to https://[employer]. org and sign in to your [employer] account. Enter C947 in the [facility] box to learn more about \"Cellulitis: Care Instructions. \"     If you do not have an account, please click on the \"Sign Up Now\" link. Current as of: July 2, 2020               Content Version: 12.6  © 2006-2020 [employer]. Care instructions adapted under license by [facility] ([facility]). If you have questions about a medical condition or this instruction, always ask your healthcare professional. [place] [address] any warranty or liability for your use of this information.

## 2021-02-09 ENCOUNTER — HOSPITAL ENCOUNTER (OUTPATIENT)
Dept: GENERAL RADIOLOGY | Facility: HOSPITAL | Age: 71
Discharge: HOME | End: 2021-02-09
Admitting: NURSE PRACTITIONER

## 2021-02-09 ENCOUNTER — OFFICE VISIT (OUTPATIENT)
Dept: NEUROSURGERY | Facility: CLINIC | Age: 71
End: 2021-02-09

## 2021-02-09 VITALS — WEIGHT: 151.6 LBS | HEIGHT: 68 IN | BODY MASS INDEX: 22.97 KG/M2

## 2021-02-09 DIAGNOSIS — R29.2 HYPERREFLEXIA: ICD-10-CM

## 2021-02-09 DIAGNOSIS — Z72.0 TOBACCO ABUSE: ICD-10-CM

## 2021-02-09 DIAGNOSIS — R20.0 NUMBNESS AND TINGLING OF BOTH UPPER EXTREMITIES: ICD-10-CM

## 2021-02-09 DIAGNOSIS — R20.2 NUMBNESS AND TINGLING OF BOTH UPPER EXTREMITIES: ICD-10-CM

## 2021-02-09 DIAGNOSIS — M54.2 CERVICALGIA: ICD-10-CM

## 2021-02-09 DIAGNOSIS — R25.8 CLONUS: ICD-10-CM

## 2021-02-09 DIAGNOSIS — M54.2 CERVICALGIA: Primary | ICD-10-CM

## 2021-02-09 PROBLEM — K40.20 NON-RECURRENT BILATERAL INGUINAL HERNIA WITHOUT OBSTRUCTION OR GANGRENE: Status: ACTIVE | Noted: 2019-09-11

## 2021-02-09 PROCEDURE — 99204 OFFICE O/P NEW MOD 45 MIN: CPT | Performed by: NURSE PRACTITIONER

## 2021-02-09 PROCEDURE — 99406 BEHAV CHNG SMOKING 3-10 MIN: CPT | Performed by: NURSE PRACTITIONER

## 2021-02-09 PROCEDURE — 72052 X-RAY EXAM NECK SPINE 6/>VWS: CPT

## 2021-02-09 RX ORDER — DIPHENOXYLATE HYDROCHLORIDE AND ATROPINE SULFATE 2.5; .025 MG/1; MG/1
1 TABLET ORAL DAILY
COMMUNITY

## 2021-02-09 RX ORDER — VARENICLINE TARTRATE 1 MG/1
1 TABLET, FILM COATED ORAL 2 TIMES DAILY
Qty: 56 TABLET | Refills: 1 | Status: SHIPPED | OUTPATIENT
Start: 2021-03-09 | End: 2021-05-04

## 2021-02-09 NOTE — PROGRESS NOTES
Primary Care Provider: Erica Kim APRN  Requesting Provider: Erica Kim AP*    Chief Complaint:   Chief Complaint   Patient presents with   • Neck Pain     Complaints of some neck pain.    Reports mostly numbness and tingling in bilateral hand and arms.    Pt has taken a steroid and it has improved neck pain.     History of Present Illness  Consultation today at the request of DIALLO Almanza    HISTORY/ HPI:  Eduard Tam is a 70 y.o.  male who present today with a complaint of minimal neck pain, primarily numbness and tingling to the bilateral upper extremities, 10% neck, 90% upper extremities.  No previous spine surgeries.  No known injury.    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  His symptoms have decreased to some extent since onset after use of a Medrol Dosepak which he obtained from his PCP.    He currently complains of minimal neck pain, primarily intermittent numbness and tingling to the bilateral upper extremities in a nondermatomal distribution which extends to include all digits.  He denies upper extremity radicular pain or Lhermitte's.  His symptoms worsen throughout the day, with overhead manual labor, and an immediate onset of upper extremity numbness and tingling with extension of his cervical spine.  Alleviating factors include cervical flexion.  He denies weakness, a decline in fine motor skills, frequently dropped items, gait or balance abnormalities, or falls.  He additionally denies fevers, chills, night sweats, unexplained weight loss, saddle anesthesia, or bowel or bladder dysfunction.  He currently rates the severity of his symptoms 2/10.  No additional concerns at this time.    Mr. Tam has not completed nor participated in a dedicated course of physician directed physical therapy, massage and/or chiropractic care, nor been evaluated by  pain management.    Oswestry Disability Index = 14%   Score   Pain Intensity No pain - 0   Personal Care Look after myself normally without causing extra pain-0   Lifting Lift heavy weights but gives extra pain-1   Reading Read with slight pain-1   Headaches Moderate infrequent headaches-2   Concentration Concentrate Fully-0   Work I can do my usual work, but no more-2   Driving I can drive-0   Sleeping No trouble sleeping-0   Recreation All recreational activities with some pain-1     SCORE INTERPRETATION OF THE OSWESTRY NECK DISABILITY QUESTIONNAIRE  10-28: Mild disability   (Dane et al, 1980)    Modified [employer] (mJOA) score  CATEGORY SCORE   Upper extremity Motor Subscore Normal hand coordination-5   Lower Extremity Subscore Normal gait-7   Upper Extremity Sensory Score Normal hand sensation-3   Urinary Function Subscore Normal urination-3   TOTAL = 18/18    The mJOA is an 18 point score of functional disability specific to cervical myelopathy.   15-18: Mild Myelopathy  Jose J et al. (1991). Hernán et al.     The mJOA is an 18 point score of functional disability specific to cervical myelopathy. Jose J et al. (1991).[2]    ROS:  Review of Systems   Constitutional: Negative.  Negative for chills, fever and unexpected weight change.   HENT: Negative.    Eyes: Negative.    Respiratory: Negative.    Cardiovascular: Negative.    Gastrointestinal: Negative.    Endocrine: Negative.    Musculoskeletal: Positive for neck pain and neck stiffness.   Allergic/Immunologic: Negative.    Neurological: Positive for numbness.   Hematological: Negative.    Psychiatric/Behavioral: Negative.    All other systems reviewed and are negative.    History reviewed. No pertinent past medical history.    History reviewed. No pertinent surgical history.    Family History: family history is not on file.    Social History:  reports that he has been smoking. He does not have any smokeless tobacco history on file.  "He reports current alcohol use. He reports that he does not use drugs.    Medications:    Current Outpatient Medications:   •  multivitamin (THERAGRAN) tablet tablet, Take 1 tablet by mouth., Disp: , Rfl:   •  [START ON 3/9/2021] varenicline (Chantix Continuing Month Brian) 1 MG tablet, Take 1 tablet by mouth 2 (Two) Times a Day for 56 days., Disp: 56 tablet, Rfl: 1  •  varenicline (CHANTIX BRIAN) 0.5 MG X 11 & 1 MG X 42 tablet, Take 0.5 mg po daily x 3 days, then 0.5 mg po bid x 4 days, then 1 mg po bid, Disp: 53 tablet, Rfl: 0    Allergies:  Patient has no known allergies.    OBJECTIVE:  Objective   Ht 172.7 cm (68\") Comment: pt reports  Wt 68.8 kg (151 lb 9.6 oz)   BMI 23.05 kg/m²   Physical Exam  Vitals signs and nursing note reviewed.   Constitutional:       General: He is not in acute distress.     Appearance: Normal appearance. He is well-developed, well-groomed and normal weight. He is not ill-appearing, toxic-appearing or diaphoretic.   HENT:      Head: Normocephalic and atraumatic.      Right Ear: Hearing normal.      Left Ear: Hearing normal.   Eyes:      Extraocular Movements: EOM normal.      Conjunctiva/sclera: Conjunctivae normal.      Pupils: Pupils are equal, round, and reactive to light.   Neck:      Musculoskeletal: Full passive range of motion without pain and neck supple.      Trachea: Trachea normal.   Cardiovascular:      Rate and Rhythm: Normal rate and regular rhythm.   Pulmonary:      Effort: Pulmonary effort is normal. No tachypnea, bradypnea, accessory muscle usage or respiratory distress.   Abdominal:      Palpations: Abdomen is soft.   Skin:     General: Skin is warm and dry.   Neurological:      Mental Status: He is alert and oriented to person, place, and time.      GCS: GCS eye subscore is 4. GCS verbal subscore is 5. GCS motor subscore is 6.      Gait: Gait is intact.      Deep Tendon Reflexes:      Reflex Scores:       Tricep reflexes are 4+ on the right side and 3+ on the left " side.       Bicep reflexes are 4+ on the right side and 3+ on the left side.       Brachioradialis reflexes are 4+ on the right side and 3+ on the left side.       Patellar reflexes are 3+ on the right side and 3+ on the left side.       Achilles reflexes are 3+ on the right side and 3+ on the left side.  Psychiatric:         Speech: Speech normal.         Behavior: Behavior normal. Behavior is cooperative.       Neurologic Exam     Mental Status   Oriented to person, place, and time.   Attention: normal. Concentration: normal.   Speech: speech is normal   Level of consciousness: alert    Cranial Nerves     CN II   Visual fields full to confrontation.     CN III, IV, VI   Pupils are equal, round, and reactive to light.  Extraocular motions are normal.   CN VI: left CN VI palsy  Nystagmus: none     CN V   Facial sensation intact.     CN VII   Facial expression full, symmetric.     CN VIII   CN VIII normal.     CN IX, X   CN IX normal.     CN XI   CN XI normal.     Motor Exam   Right arm tone: normal  Left arm tone: normal  Right leg tone: normal  Left leg tone: normal    Strength   Right deltoid: 5/5  Left deltoid: 5/5  Right biceps: 5/5  Left biceps: 5/5  Right triceps: 5/5  Left triceps: 5/5  Right wrist extension: 5/5  Left wrist extension: 5/5  Right iliopsoas: 5/5  Left iliopsoas: 5/5  Right quadriceps: 5/5  Left quadriceps: 5/5  Right anterior tibial: 5/5  Left anterior tibial: 5/5  Right gastroc: 5/5  Left gastroc: 5/5  Right EHL 5/5  Left EHL 5/5       Sensory Exam   Right arm light touch: normal  Left arm light touch: normal  Right leg light touch: normal  Left leg light touch: normal  Sensory deficit distribution on right: non-dermatomal distribution  Sensory deficit distribution on left: non-dermatomal distribution    Gait, Coordination, and Reflexes     Gait  Gait: normal    Tremor   Resting tremor: absent  Intention tremor: absent  Action tremor: absent    Reflexes   Right brachioradialis: 4+  Left  brachioradialis: 3+  Right biceps: 4+  Left biceps: 3+  Right triceps: 4+  Left triceps: 3+  Right patellar: 3+  Left patellar: 3+  Right achilles: 3+  Left achilles: 3+  Right plantar: normal  Left plantar: normal  Right Rothman: absent  Left Rothman: absent  Right ankle clonus: present (2-3 beats )  Left ankle clonus: absent  Right pendular knee jerk: absent  Left pendular knee jerk: absent    Male  strength (pounds)  AGE Right Hand RH Norms Left Hand LH Norms   20-24  121+20.6  104+21.8   25-29  120+23.0  110+16.2   30-34  121+22.4  110+21.7   35-39  119+24  113+21.7   40-44  117+20.7  112+18.7   45-49  110+23.0  101+22.8   50-54  113+18.1  102+17   55-59  101+26.7  83+23.4   60-64  90+20.4  77+20.3   65-69 95 91+20.6 100 76.8+19.8   70-74  75+21.5  65+18.1   75+  66+21.0  55+17.0   (VICK Stewart et al; Hand Dynometer: Effects of trials and sessions.  Perpetual and Motor Skills 61:195-8, 1985)  * = Dominant hand  > = Intervention    Imaging: (independent review and interpretation)  No radiology results for the last 30 days.    ASSESSMENT/ PLAN:  Eduard Tam is a 70 y.o. male with a significant medical history of tobacco abuse with a 50-pack-year history.  He presents today with a new problem of minimal neck pain, primarily numbness and tingling to the bilateral upper extremities in a nondermatomal distribution, 10% neck, 90% arms.  ROSMERY: 14.  mJOA: 18.  Physical exam findings of bilateral  strengths above age-matched controls, left abducens nerve palsy, no focal weakness, gross hyperreflexia without Fanny's, 2-3 beats of clonus noted on the right, and a normal gait. No radiology results for the last 30 days.    TREATMENT RECOMMENDATIONS ...  Cervicalgia  Bilateral upper extremity numbness and tingling in a nondermatomal distribution  Gross hyperreflexia with left clonus  DDX:  Congenital: Chiari malformation, tethered cord, syringomyelia, hereditary spastic paraplegia  Acquired: Cervical or thoracic spinal  stenosis, traumatic, herniated disc, kyphosis, extra medullary hematopoiesis, epidural lipomatosis, OPLL, Paget's disease  Neoplastic: Spinal cord tumors intra or extra medullary, carcinomatosis, paraneoplastic syndromes  Vascular: Epidural hematoma, spinal cord infarction, vascular malformation such as AVM  Iatrogenic: Radiation myelopathy  Infectious: Post viral or autoimmune.  Often seen in HIV, syphilis, cytomegalovirus, herpes simplex 2 and CJD  Demyelinating: Acute transverse myelitis, multiple sclerosis, Devic's syndrome  Metabolic: Subacute combined systemic disease due to vitamin B-12 deficiency  Motor neuron disease: Amyotrophic lateral sclerosis, primary lateral sclerosis    Eduard presents with minimal neck pain, primarily numbness and tingling to the bilateral upper extremities in a nondermatomal distribution.  Upon physical exam he was found to be grossly hyperreflexive with 2-3 beats of clonus on the right. Mr. Tam has not undergone conservative management and has no recent imaging.  Therefore we recommend ...    We will proceed today by obtaining x-rays of the cervical spine complete with flexion and extension.  As a means of first-line conservative management for Cervical pain, we will send Eduard for a dedicated course of physician directed physical therapy to include cervical traction; Rx provided.  I additionally recommended massage care as tolerated.  Unless contraindicated, Tylenol and ibuprofen or naproxen PRN per package instructions for pain, or may continue current pain medications as previously prescribed by outside clinician.  B/R/AE and use discussed.  Given his complaints and physical exam findings of hyperreflexia with clonus, we will obtain an MRI of the cervical spine to rule out need for surgical intervention and have him return for reassessment with Dr. Solis at his next available appointment.  If his MRI is denied, we will have him return for reassessment with me after  completion of physical therapy.  Considering his symptoms worsen with cervical extension, I recommended he use strict precaution while ambulating to avoid falling and avoid hyperextension of the cervical spine.  I advised the patient to call to return sooner for new or worsening complaints of weakness, paresthesias, gait disturbances, or any additional concerns.  Treatment options discussed in detail with Eduard and he voiced understanding.  Mr. Tam agrees with this plan of care.    Tobacco abuse  Patient was counseled on and understood the many dangers of continuing to use tobacco.  We discussed nonpharmacologic options and pharmacologic options to include the use of the nicotine patches and/or gum, as well as Chantix.   Mr. Tam has agreed to trial Chantix.  Rx for Chantix starter pack provided.  B/R/AE and use discussed.  I advised the patient to follow-up with Erica Kim APRN within next month to continue this medication or sooner to reports any concerns for adverse effects.   7 minutes were spent in this counseling.    Diagnoses and all orders for this visit:    1. Cervicalgia (Primary)  -     Ambulatory Referral to Physical Therapy Evaluate and treat (Please include cervical traction)  -     XR Spine Cervical Complete With Flex Ext; Future  -     MRI Cervical Spine Without Contrast; Future    2. Numbness and tingling of both upper extremities  -     Ambulatory Referral to Physical Therapy Evaluate and treat (Please include cervical traction)  -     XR Spine Cervical Complete With Flex Ext; Future  -     MRI Cervical Spine Without Contrast; Future    3. Hyperreflexia  -     MRI Cervical Spine Without Contrast; Future    4. Clonus  -     MRI Cervical Spine Without Contrast; Future    5. Tobacco abuse  -     varenicline (CHANTIX BRIAN) 0.5 MG X 11 & 1 MG X 42 tablet; Take 0.5 mg po daily x 3 days, then 0.5 mg po bid x 4 days, then 1 mg po bid  Dispense: 53 tablet; Refill: 0  -     varenicline  (Chantix Continuing Month Blake) 1 MG tablet; Take 1 tablet by mouth 2 (Two) Times a Day for 56 days.  Dispense: 56 tablet; Refill: 1    Return for FOLLOW UP WITH DR. ANDRÉS PEREZ AVALAIBLE pending approval of MRI.    Thank you for this Consultation and the opportunity to participate in [facility]'s care.    Sincerely,  DIALLO Iniguez    Level of Risk: Moderate due to: undiagnosed new problem  MDM: Moderate Complexity  (Mod = 41423, High = 50046)

## 2021-02-09 NOTE — PATIENT INSTRUCTIONS
Tobacco Use Disorder  Tobacco use disorder (TUD) occurs when a person craves, seeks, and uses tobacco, regardless of the consequences. This disorder can cause problems with mental and physical health. It can affect your ability to have healthy relationships, and it can keep you from meeting your responsibilities at work, home, or school.  Tobacco may be:  · Smoked as a cigarette or cigar.  · Inhaled using e-cigarettes.  · Smoked in a pipe or hookah.  · Chewed as smokeless tobacco.  · Inhaled into the nostrils as snuff.  Tobacco products contain a dangerous chemical called nicotine, which is very addictive. Nicotine triggers hormones that make the body feel stimulated and works on areas of the brain that make you feel good. These effects can make it hard for people to quit nicotine.  Tobacco contains many other unsafe chemicals that can damage almost every organ in the body. Smoking tobacco also puts others in danger due to fire risk and possible health problems caused by breathing in secondhand smoke.  What are the signs or symptoms?  Symptoms of TUD may include:  · Being unable to slow down or stop your tobacco use.  · Spending an abnormal amount of time getting or using tobacco.  · Craving tobacco. Cravings may last for up to 6 months after quitting.  · Tobacco use that:  ? Interferes with your work, school, or home life.  ? Interferes with your personal and social relationships.  ? Makes you give up activities that you once enjoyed or found important.  · Using tobacco even though you know that it is:  ? Dangerous or bad for your health or someone else's health.  ? Causing problems in your life.  · Needing more and more of the substance to get the same effect (developing tolerance).  · Experiencing unpleasant symptoms if you do not use the substance (withdrawal). Withdrawal symptoms may include:  ? Depressed, anxious, or irritable mood.  ? Difficulty concentrating.  ? Increased appetite.  ? Restlessness or trouble  sleeping.  · Using the substance to avoid withdrawal.  How is this diagnosed?  This condition may be diagnosed based on:  · Your current and past tobacco use. Your health care provider may ask questions about how your tobacco use affects your life.  · A physical exam.  You may be diagnosed with TUD if you have at least two symptoms within a 12-month period.  How is this treated?  This condition is treated by stopping tobacco use. Many people are unable to quit on their own and need help. Treatment may include:  · Nicotine replacement therapy (NRT). NRT provides nicotine without the other harmful chemicals in tobacco. NRT gradually lowers the dosage of nicotine in the body and reduces withdrawal symptoms. NRT is available as:  ? Over-the-counter gums, lozenges, and skin patches.  ? Prescription mouth inhalers and nasal sprays.  · Medicine that acts on the brain to reduce cravings and withdrawal symptoms.  · A type of talk therapy that examines your triggers for tobacco use, how to avoid them, and how to cope with cravings (behavioral therapy).  · Hypnosis. This may help with withdrawal symptoms.  · Joining a support group for others coping with TUD.  The best treatment for TUD is usually a combination of medicine, talk therapy, and support groups. Recovery can be a long process. Many people start using tobacco again after stopping (relapse). If you relapse, it does not mean that treatment will not work.  Follow these instructions at home:    Lifestyle  · Do not use any products that contain nicotine or tobacco, such as cigarettes and e-cigarettes.  · Avoid things that trigger tobacco use as much as you can. Triggers include people and situations that usually cause you to use tobacco.  · Avoid drinks that contain caffeine, including coffee. These may worsen some withdrawal symptoms.  · Find ways to manage stress. Wanting to smoke may cause stress, and stress can make you want to smoke. Relaxation techniques such as  deep breathing, meditation, and yoga may help.  · Attend support groups as needed. These groups are an important part of long-term recovery for many people.  General instructions  · Take over-the-counter and prescription medicines only as told by your health care provider.  · Check with your health care provider before taking any new prescription or over-the-counter medicines.  · Decide on a friend, family member, or smoking quit-line (such as 1-800-QUIT-NOW in the [place] that you can call or text when you feel the urge to smoke or when you need help coping with cravings.  · Keep all follow-up visits as told by your health care provider and therapist. This is important.  Contact a health care provider if:  · You are not able to take your medicines as prescribed.  · Your symptoms get worse, even with treatment.  Summary  · Tobacco use disorder (TUD) occurs when a person craves, seeks, and uses tobacco regardless of the consequences.  · This condition may be diagnosed based on your current and past tobacco use and a physical exam.  · Many people are unable to quit on their own and need help. Recovery can be a long process.  · The most effective treatment for TUD is usually a combination of medicine, talk therapy, and support groups.  This information is not intended to replace advice given to you by your health care provider. Make sure you discuss any questions you have with your health care provider.  Document Revised: 12/05/2018 Document Reviewed: 12/05/2018  Elsevier Patient Education © 2020 [employer]      Advance Care Planning and Advance Directives     You make decisions on a daily basis - decisions about where you want to live, your career, your home, your life. Perhaps one of the most important decisions you face is your choice for future medical care. Take time to talk with your family and your healthcare team and start planning today.  Advance Care Planning is a process that can help you:  · Understand  possible future healthcare decisions in light of your own experiences  · Reflect on those decision in light of your goals and values  · Discuss your decisions with those closest to you and the healthcare professionals that care for you  · Make a plan by creating a document that reflects your wishes    Surrogate Decision Maker  In the event of a medical emergency, which has left you unable to communicate or to make your own decisions, you would need someone to make decisions for you.  It is important to discuss your preferences for medical treatment with this person while you are in good health.     Qualities of a surrogate decision maker:  • Willing to take on this role and responsibility  • Knows what you want for future medical care  • Willing to follow your wishes even if they don't agree with them  • Able to make difficult medical decisions under stressful circumstances    Advance Directives  These are legal documents you can create that will guide your healthcare team and decision maker(s) when needed. These documents can be stored in the electronic medical record.    · Living Will - a legal document to guide your care if you have a terminal condition or a serious illness and are unable to communicate. States vary by statute in document names/types, but most forms may include one or more of the following:        -  Directions regarding life-prolonging treatments        -  Directions regarding artificially provided nutrition/hydration        -  Choosing a healthcare decision maker        -  Direction regarding organ/tissue donation    · Durable Power of  for Healthcare - this document names an -in-fact to make medical decisions for you, but it may also allow this person to make personal and financial decisions for you. Please seek the advice of an  if you need this type of document.    **Advance Directives are not required and no one may discriminate against you if you do not sign  one.    Medical Orders  Many states allow specific forms/orders signed by your physician to record your wishes for medical treatment in your current state of health. This form, signed in personal communication with your physician, addresses resuscitation and other medical interventions that you may or may not want.      For more information or to schedule a time with a [facility] Advance Care Planning Facilitator contact: [facility] or call [phone] and someone will contact you directly.  Varenicline oral tablets  What is this medicine?  VARENICLINE (pratima e NI kleen) is used to help people quit smoking. It is used with a patient support program recommended by your physician.  This medicine may be used for other purposes; ask your health care provider or pharmacist if you have questions.  COMMON BRAND NAME(S): Roni  What should I tell my health care provider before I take this medicine?  They need to know if you have any of these conditions:  · heart disease  · if you often drink alcohol  · kidney disease  · mental illness  · on hemodialysis  · seizures  · history of stroke  · suicidal thoughts, plans, or attempt; a previous suicide attempt by you or a family member  · an unusual or allergic reaction to varenicline, other medicines, foods, dyes, or preservatives  · pregnant or trying to get pregnant  · breast-feeding  How should I use this medicine?  Take this medicine by mouth after eating. Take with a full glass of water. Follow the directions on the prescription label. Take your doses at regular intervals. Do not take your medicine more often than directed.  There are 3 ways you can use this medicine to help you quit smoking; talk to your health care professional to decide which plan is right for you:  1) you can choose a quit date and start this medicine 1 week before the quit date, or,  2) you can start taking this medicine before you choose a quit date, and then pick a quit date between day  8 and 35 days of treatment, or,  3) if you are not sure that you are able or willing to quit smoking right away, start taking this medicine and slowly decrease the amount you smoke as directed by your health care professional with the goal of being cigarette-free by week 12 of treatment.  Stick to your plan; ask about support groups or other ways to help you remain cigarette-free. If you are motivated to quit smoking and did not succeed during a previous attempt with this medicine for reasons other than side effects, or if you returned to smoking after this treatment, speak with your health care professional about whether another course of this medicine may be right for you.  A special MedGuide will be given to you by the pharmacist with each prescription and refill. Be sure to read this information carefully each time.  Talk to your pediatrician regarding the use of this medicine in children. This medicine is not approved for use in children.  Overdosage: If you think you have taken too much of this medicine contact a poison control center or emergency room at once.  NOTE: This medicine is only for you. Do not share this medicine with others.  What if I miss a dose?  If you miss a dose, take it as soon as you can. If it is almost time for your next dose, take only that dose. Do not take double or extra doses.  What may interact with this medicine?  · alcohol  · insulin  · other medicines used to help people quit smoking  · theophylline  · warfarin  This list may not describe all possible interactions. Give your health care provider a list of all the medicines, herbs, non-prescription drugs, or dietary supplements you use. Also tell them if you smoke, drink alcohol, or use illegal drugs. Some items may interact with your medicine.  What should I watch for while using this medicine?  It is okay if you do not succeed at your attempt to quit and have a cigarette. You can still continue your quit attempt and keep using  "this medicine as directed. Just throw away your cigarettes and get back to your quit plan.  Talk to your health care provider before using other treatments to quit smoking. Using this medicine with other treatments to quit smoking may increase the risk for side effects compared to using a treatment alone.  You may get drowsy or dizzy. Do not drive, use machinery, or do anything that needs mental alertness until you know how this medicine affects you. Do not stand or sit up quickly, especially if you are an older patient. This reduces the risk of dizzy or fainting spells.  Decrease the number of alcoholic beverages that you drink during treatment with this medicine until you know if this medicine affects your ability to tolerate alcohol. Some people have experienced increased drunkenness (intoxication), unusual or sometimes aggressive behavior, or no memory of things that have happened (amnesia) during treatment with this medicine.  Sleepwalking can happen during treatment with this medicine, and can sometimes lead to behavior that is harmful to you, other people, or property. Stop taking this medicine and tell your doctor if you start sleepwalking or have other unusual sleep-related activity.  After taking this medicine, you may get up out of bed and do an activity that you do not know you are doing. The next morning, you may have no memory of this. Activities include driving a car (\"sleep-driving\"), making and eating food, talking on the phone, sexual activity, and sleep-walking. Serious injuries have occurred. Stop the medicine and call your doctor right away if you find out you have done any of these activities. Do not take this medicine if you have used alcohol that evening. Do not take it if you have taken another medicine for sleep. The risk of doing these sleep-related activities is higher.  Patients and their families should watch out for new or worsening depression or thoughts of suicide. Also watch out for " sudden changes in feelings such as feeling anxious, agitated, panicky, irritable, hostile, aggressive, impulsive, severely restless, overly excited and hyperactive, or not being able to sleep. If this happens, call your health care professional.  If you have diabetes and you quit smoking, the effects of insulin may be increased and you may need to reduce your insulin dose. Check with your doctor or health care professional about how you should adjust your insulin dose.  What side effects may I notice from receiving this medicine?  Side effects that you should report to your doctor or health care professional as soon as possible:  · allergic reactions like skin rash, itching or hives, swelling of the face, lips, tongue, or throat  · acting aggressive, being angry or violent, or acting on dangerous impulses  · breathing problems  · changes in emotions or moods  · chest pain or chest tightness  · feeling faint or lightheaded, falls  · hallucination, loss of contact with reality  · mouth sores  · redness, blistering, peeling or loosening of the skin, including inside the mouth  · signs and symptoms of a stroke like changes in vision; confusion; trouble speaking or understanding; severe headaches; sudden numbness or weakness of the face, arm or leg; trouble walking; dizziness; loss of balance or coordination  · seizures  · sleepwalking  · suicidal thoughts or other mood changes  Side effects that usually do not require medical attention (report to your doctor or health care professional if they continue or are bothersome):  · constipation  · gas  · headache  · nausea, vomiting  · strange dreams  · trouble sleeping  This list may not describe all possible side effects. Call your doctor for medical advice about side effects. You may report side effects to FDA at 2-606-FDA-9008.  Where should I keep my medicine?  Keep out of the reach of children.  Store at room temperature between 15 and 30 degrees C (59 and 86 degrees F).  Throw away any unused medicine after the expiration date.  NOTE: This sheet is a summary. It may not cover all possible information. If you have questions about this medicine, talk to your doctor, pharmacist, or health care provider.  © 2020 [employer] (2019-12-06 14:27:36)

## 2021-02-22 ENCOUNTER — HOSPITAL ENCOUNTER (OUTPATIENT)
Dept: MRI IMAGING | Facility: HOSPITAL | Age: 71
Discharge: HOME | End: 2021-02-22
Admitting: NURSE PRACTITIONER

## 2021-02-22 DIAGNOSIS — R20.2 NUMBNESS AND TINGLING OF BOTH UPPER EXTREMITIES: ICD-10-CM

## 2021-02-22 DIAGNOSIS — R25.8 CLONUS: ICD-10-CM

## 2021-02-22 DIAGNOSIS — R20.0 NUMBNESS AND TINGLING OF BOTH UPPER EXTREMITIES: ICD-10-CM

## 2021-02-22 DIAGNOSIS — R29.2 HYPERREFLEXIA: ICD-10-CM

## 2021-02-22 DIAGNOSIS — M54.2 CERVICALGIA: ICD-10-CM

## 2021-02-22 PROCEDURE — 72141 MRI NECK SPINE W/O DYE: CPT

## 2021-02-23 ENCOUNTER — TELEPHONE (OUTPATIENT)
Dept: NEUROSURGERY | Facility: CLINIC | Age: 71
End: 2021-02-23

## 2021-02-23 NOTE — TELEPHONE ENCOUNTER
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

## 2021-02-24 ENCOUNTER — OFFICE VISIT (OUTPATIENT)
Dept: NEUROSURGERY | Facility: CLINIC | Age: 71
End: 2021-02-24

## 2021-02-24 ENCOUNTER — LAB (OUTPATIENT)
Dept: LAB | Facility: HOSPITAL | Age: 71
End: 2021-02-24

## 2021-02-24 VITALS — WEIGHT: 151 LBS | HEIGHT: 68 IN | BODY MASS INDEX: 22.88 KG/M2

## 2021-02-24 DIAGNOSIS — G95.9 CERVICAL MYELOPATHY (HCC): Primary | ICD-10-CM

## 2021-02-24 DIAGNOSIS — M83.2 ADULT OSTEOMALACIA DUE TO MALABSORPTION: ICD-10-CM

## 2021-02-24 DIAGNOSIS — M48.02 CERVICAL SPINAL STENOSIS: ICD-10-CM

## 2021-02-24 DIAGNOSIS — Z72.0 TOBACCO ABUSE: ICD-10-CM

## 2021-02-24 DIAGNOSIS — G95.9 CERVICAL MYELOPATHY (HCC): ICD-10-CM

## 2021-02-24 DIAGNOSIS — M80.08XA AGE-RELATED OSTEOPOROSIS WITH CURRENT PATHOLOGICAL FRACTURE, VERTEBRA(E), INITIAL ENCOUNTER FOR FRACTURE (HCC): ICD-10-CM

## 2021-02-24 PROBLEM — R20.0 NUMBNESS AND TINGLING OF BOTH UPPER EXTREMITIES: Status: RESOLVED | Noted: 2021-02-09 | Resolved: 2021-02-24

## 2021-02-24 PROBLEM — R20.2 NUMBNESS AND TINGLING OF BOTH UPPER EXTREMITIES: Status: RESOLVED | Noted: 2021-02-09 | Resolved: 2021-02-24

## 2021-02-24 LAB
ALBUMIN SERPL-MCNC: 4 G/DL (ref 3.5–5.2)
ALBUMIN/GLOB SERPL: 1.3 G/DL
ALP SERPL-CCNC: 91 U/L (ref 39–117)
ALT SERPL W P-5'-P-CCNC: 14 U/L (ref 1–41)
ANION GAP SERPL CALCULATED.3IONS-SCNC: 8 MMOL/L (ref 5–15)
AST SERPL-CCNC: 21 U/L (ref 1–40)
BASOPHILS # BLD AUTO: 0.06 10*3/MM3 (ref 0–0.2)
BASOPHILS NFR BLD AUTO: 1 % (ref 0–1.5)
BILIRUB SERPL-MCNC: 0.6 MG/DL (ref 0–1.2)
BUN SERPL-MCNC: 14 MG/DL (ref 8–23)
BUN/CREAT SERPL: 16.9 (ref 7–25)
CALCIUM SPEC-SCNC: 11.6 MG/DL (ref 8.6–10.5)
CHLORIDE SERPL-SCNC: 103 MMOL/L (ref 98–107)
CO2 SERPL-SCNC: 26 MMOL/L (ref 22–29)
CREAT SERPL-MCNC: 0.83 MG/DL (ref 0.76–1.27)
DEPRECATED RDW RBC AUTO: 48.2 FL (ref 37–54)
EOSINOPHIL # BLD AUTO: 0.13 10*3/MM3 (ref 0–0.4)
EOSINOPHIL NFR BLD AUTO: 2.2 % (ref 0.3–6.2)
ERYTHROCYTE [DISTWIDTH] IN BLOOD BY AUTOMATED COUNT: 14.2 % (ref 12.3–15.4)
GFR SERPL CREATININE-BSD FRML MDRD: 92 ML/MIN/1.73
GLOBULIN UR ELPH-MCNC: 3.1 GM/DL
GLUCOSE SERPL-MCNC: 114 MG/DL (ref 65–99)
HCT VFR BLD AUTO: 47.2 % (ref 37.5–51)
HGB BLD-MCNC: 15.8 G/DL (ref 13–17.7)
IMM GRANULOCYTES # BLD AUTO: 0.01 10*3/MM3 (ref 0–0.05)
IMM GRANULOCYTES NFR BLD AUTO: 0.2 % (ref 0–0.5)
LYMPHOCYTES # BLD AUTO: 1.59 10*3/MM3 (ref 0.7–3.1)
LYMPHOCYTES NFR BLD AUTO: 27.4 % (ref 19.6–45.3)
MCH RBC QN AUTO: 30.9 PG (ref 26.6–33)
MCHC RBC AUTO-ENTMCNC: 33.5 G/DL (ref 31.5–35.7)
MCV RBC AUTO: 92.2 FL (ref 79–97)
MONOCYTES # BLD AUTO: 0.62 10*3/MM3 (ref 0.1–0.9)
MONOCYTES NFR BLD AUTO: 10.7 % (ref 5–12)
NEUTROPHILS NFR BLD AUTO: 3.39 10*3/MM3 (ref 1.7–7)
NEUTROPHILS NFR BLD AUTO: 58.5 % (ref 42.7–76)
NRBC BLD AUTO-RTO: 0 /100 WBC (ref 0–0.2)
PLATELET # BLD AUTO: 212 10*3/MM3 (ref 140–450)
PMV BLD AUTO: 9.5 FL (ref 6–12)
POTASSIUM SERPL-SCNC: 4.1 MMOL/L (ref 3.5–5.2)
PROT SERPL-MCNC: 7.1 G/DL (ref 6–8.5)
RBC # BLD AUTO: 5.12 10*6/MM3 (ref 4.14–5.8)
SODIUM SERPL-SCNC: 137 MMOL/L (ref 136–145)
WBC # BLD AUTO: 5.8 10*3/MM3 (ref 3.4–10.8)

## 2021-02-24 PROCEDURE — 82306 VITAMIN D 25 HYDROXY: CPT

## 2021-02-24 PROCEDURE — 36415 COLL VENOUS BLD VENIPUNCTURE: CPT

## 2021-02-24 PROCEDURE — 85025 COMPLETE CBC W/AUTO DIFF WBC: CPT

## 2021-02-24 PROCEDURE — 80053 COMPREHEN METABOLIC PANEL: CPT

## 2021-02-24 PROCEDURE — 83970 ASSAY OF PARATHORMONE: CPT

## 2021-02-24 PROCEDURE — 99215 OFFICE O/P EST HI 40 MIN: CPT | Performed by: NEUROLOGICAL SURGERY

## 2021-02-24 RX ORDER — BUPROPION HYDROCHLORIDE 75 MG/1
75 TABLET ORAL 2 TIMES DAILY
Qty: 60 TABLET | Refills: 3 | Status: SHIPPED | OUTPATIENT
Start: 2021-02-24 | End: 2021-05-10

## 2021-02-24 RX ORDER — CHLORHEXIDINE GLUCONATE 4 G/100ML
SOLUTION TOPICAL
Qty: 120 ML | Refills: 0 | Status: ON HOLD | OUTPATIENT
Start: 2021-02-24 | End: 2021-03-09
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
## 2021-02-24 NOTE — PATIENT INSTRUCTIONS
"PATIENT TO CONTINUE TO FOLLOW UP WITH HIS/HER PRIMARY CARE PROVIDER FOR YEARLY PHYSICAL EXAMS TO ENSURE COMPLETE HEALTH MAINTENANCE    DASH Eating Plan  DASH stands for \"Dietary Approaches to Stop Hypertension.\" The DASH eating plan is a healthy eating plan that has been shown to reduce high blood pressure (hypertension). It may also reduce your risk for type 2 diabetes, heart disease, and stroke. The DASH eating plan may also help with weight loss.  What are tips for following this plan?    General guidelines  · Avoid eating more than 2,300 mg (milligrams) of salt (sodium) a day. If you have hypertension, you may need to reduce your sodium intake to 1,500 mg a day.  · Limit alcohol intake to no more than 1 drink a day for nonpregnant women and 2 drinks a day for men. One drink equals 12 oz of beer, 5 oz of wine, or 1½ oz of hard liquor.  · Work with your health care provider to maintain a healthy body weight or to lose weight. Ask what an ideal weight is for you.  · Get at least 30 minutes of exercise that causes your heart to beat faster (aerobic exercise) most days of the week. Activities may include walking, swimming, or biking.  · Work with your health care provider or diet and nutrition specialist (dietitian) to adjust your eating plan to your individual calorie needs.  Reading food labels    · Check food labels for the amount of sodium per serving. Choose foods with less than 5 percent of the Daily Value of sodium. Generally, foods with less than 300 mg of sodium per serving fit into this eating plan.  · To find whole grains, look for the word \"whole\" as the first word in the ingredient list.  Shopping  · Buy products labeled as \"low-sodium\" or \"no salt added.\"  · Buy fresh foods. Avoid canned foods and premade or frozen meals.  Cooking  · Avoid adding salt when cooking. Use salt-free seasonings or herbs instead of table salt or sea salt. Check with your health care provider or pharmacist before using salt " substitutes.  · Do not orozco foods. Cook foods using healthy methods such as baking, boiling, grilling, and broiling instead.  · Cook with heart-healthy oils, such as olive, canola, soybean, or sunflower oil.  Meal planning  · Eat a balanced diet that includes:  ? 5 or more servings of fruits and vegetables each day. At each meal, try to fill half of your plate with fruits and vegetables.  ? Up to 6-8 servings of whole grains each day.  ? Less than 6 oz of lean meat, poultry, or fish each day. A 3-oz serving of meat is about the same size as a deck of cards. One egg equals 1 oz.  ? 2 servings of low-fat dairy each day.  ? A serving of nuts, seeds, or beans 5 times each week.  ? Heart-healthy fats. Healthy fats called Omega-3 fatty acids are found in foods such as flaxseeds and coldwater fish, like sardines, salmon, and mackerel.  · Limit how much you eat of the following:  ? Canned or prepackaged foods.  ? Food that is high in trans fat, such as fried foods.  ? Food that is high in saturated fat, such as fatty meat.  ? Sweets, desserts, sugary drinks, and other foods with added sugar.  ? Full-fat dairy products.  · Do not salt foods before eating.  · Try to eat at least 2 vegetarian meals each week.  · Eat more home-cooked food and less restaurant, buffet, and fast food.  · When eating at a restaurant, ask that your food be prepared with less salt or no salt, if possible.  What foods are recommended?  The items listed may not be a complete list. Talk with your dietitian about what dietary choices are best for you.  Grains  Whole-grain or whole-wheat bread. Whole-grain or whole-wheat pasta. Brown rice. Oatmeal. Quinoa. Bulgur. Whole-grain and low-sodium cereals. Mago bread. Low-fat, low-sodium crackers. Whole-wheat flour tortillas.  Vegetables  Fresh or frozen vegetables (raw, steamed, roasted, or grilled). Low-sodium or reduced-sodium tomato and vegetable juice. Low-sodium or reduced-sodium tomato sauce and tomato  paste. Low-sodium or reduced-sodium canned vegetables.  Fruits  All fresh, dried, or frozen fruit. Canned fruit in natural juice (without added sugar).  Meat and other protein foods  Skinless chicken or turkey. Ground chicken or turkey. Pork with fat trimmed off. Fish and seafood. Egg whites. Dried beans, peas, or lentils. Unsalted nuts, nut butters, and seeds. Unsalted canned beans. Lean cuts of beef with fat trimmed off. Low-sodium, lean deli meat.  Dairy  Low-fat (1%) or fat-free (skim) milk. Fat-free, low-fat, or reduced-fat cheeses. Nonfat, low-sodium ricotta or cottage cheese. Low-fat or nonfat yogurt. Low-fat, low-sodium cheese.  Fats and oils  Soft margarine without trans fats. Vegetable oil. Low-fat, reduced-fat, or light mayonnaise and salad dressings (reduced-sodium). Canola, safflower, olive, soybean, and sunflower oils. Avocado.  Seasoning and other foods  Herbs. Spices. Seasoning mixes without salt. Unsalted popcorn and pretzels. Fat-free sweets.  What foods are not recommended?  The items listed may not be a complete list. Talk with your dietitian about what dietary choices are best for you.  Grains  Baked goods made with fat, such as croissants, muffins, or some breads. Dry pasta or rice meal packs.  Vegetables  Creamed or fried vegetables. Vegetables in a cheese sauce. Regular canned vegetables (not low-sodium or reduced-sodium). Regular canned tomato sauce and paste (not low-sodium or reduced-sodium). Regular tomato and vegetable juice (not low-sodium or reduced-sodium). Pickles. Olives.  Fruits  Canned fruit in a light or heavy syrup. Fried fruit. Fruit in cream or butter sauce.  Meat and other protein foods  Fatty cuts of meat. Ribs. Fried meat. Dumont. Sausage. Bologna and other processed lunch meats. Salami. Fatback. Hotdogs. Bratwurst. Salted nuts and seeds. Canned beans with added salt. Canned or smoked fish. Whole eggs or egg yolks. Chicken or turkey with skin.  Dairy  Whole or 2% milk,  cream, and half-and-half. Whole or full-fat cream cheese. Whole-fat or sweetened yogurt. Full-fat cheese. Nondairy creamers. Whipped toppings. Processed cheese and cheese spreads.  Fats and oils  Butter. Stick margarine. Lard. Shortening. Ghee. Dumont fat. Tropical oils, such as coconut, palm kernel, or palm oil.  Seasoning and other foods  Salted popcorn and pretzels. Onion salt, garlic salt, seasoned salt, table salt, and sea salt. Worcestershire sauce. Tartar sauce. Barbecue sauce. Teriyaki sauce. Soy sauce, including reduced-sodium. Steak sauce. Canned and packaged gravies. Fish sauce. Oyster sauce. Cocktail sauce. Horseradish that you find on the shelf. Ketchup. Mustard. Meat flavorings and tenderizers. Bouillon cubes. Hot sauce and Tabasco sauce. Premade or packaged marinades. Premade or packaged taco seasonings. Relishes. Regular salad dressings.  Where to find more information:  · National Heart, Lung, and [employer] [place]: www.nhlbi.nih.gov  · American Heart Association: www.heart.org  Summary  · The DASH eating plan is a healthy eating plan that has been shown to reduce high blood pressure (hypertension). It may also reduce your risk for type 2 diabetes, heart disease, and stroke.  · With the DASH eating plan, you should limit salt (sodium) intake to 2,300 mg a day. If you have hypertension, you may need to reduce your sodium intake to 1,500 mg a day.  · When on the DASH eating plan, aim to eat more fresh fruits and vegetables, whole grains, lean proteins, low-fat dairy, and heart-healthy fats.  · Work with your health care provider or diet and nutrition specialist (dietitian) to adjust your eating plan to your individual calorie needs.  This information is not intended to replace advice given to you by your health care provider. Make sure you discuss any questions you have with your health care provider.  Document Revised: 11/30/2018 Document Reviewed: 12/11/2017  Elsevier Patient Education © 2020 [employer]      Steps to Quit Smoking  Smoking tobacco is the leading cause of preventable death. It can affect almost every organ in the body. Smoking puts you and those around you at risk for developing many serious chronic diseases. Quitting smoking can be difficult, but it is one of the best things that you can do for your health. It is never too late to quit.  How do I get ready to quit?  When you decide to quit smoking, create a plan to help you succeed. Before you quit:  · Pick a date to quit. Set a date within the next 2 weeks to give you time to prepare.  · Write down the reasons why you are quitting. Keep this list in places where you will see it often.  · Tell your family, friends, and co-workers that you are quitting. Support from your loved ones can make quitting easier.  · Talk with your health care provider about your options for quitting smoking.  · Find out what treatment options are covered by your health insurance.  · Identify people, places, things, and activities that make you want to smoke (triggers). Avoid them.  What first steps can I take to quit smoking?  · Throw away all cigarettes at home, at work, and in your car.  · Throw away smoking accessories, such as ashtrays and lighters.  · Clean your car. Make sure to empty the ashtray.  · Clean your home, including curtains and carpets.  What strategies can I use to quit smoking?  Talk with your health care provider about combining strategies, such as taking medicines while you are also receiving in-person counseling. Using these two strategies together makes you more likely to succeed in quitting than if you used either strategy on its own.  · If you are pregnant or breastfeeding, talk with your health care provider about finding counseling or other support strategies to quit smoking. Do not take medicine to help you quit smoking unless your health care provider tells you to do so.  To quit smoking:  Quit right away  · Quit smoking completely,  instead of gradually reducing how much you smoke over a period of time. Research shows that stopping smoking right away is more successful than gradually quitting.  · Attend in-person counseling to help you build problem-solving skills. You are more likely to succeed in quitting if you attend counseling sessions regularly. Even short sessions of 10 minutes can be effective.  Take medicine  You may take medicines to help you quit smoking. Some medicines require a prescription and some you can purchase over-the-counter. Medicines may have nicotine in them to replace the nicotine in cigarettes. Medicines may:  · Help to stop cravings.  · Help to relieve withdrawal symptoms.  Your health care provider may recommend:  · Nicotine patches, gum, or lozenges.  · Nicotine inhalers or sprays.  · Non-nicotine medicine that is taken by mouth.  Find resources  Find resources and support systems that can help you to quit smoking and remain smoke-free after you quit. These resources are most helpful when you use them often. They include:  · Online chats with a counselor.  · Telephone quitlines.  · Printed self-help materials.  · Support groups or group counseling.  · Text messaging programs.  · Mobile phone apps or applications. Use apps that can help you stick to your quit plan by providing reminders, tips, and encouragement. There are many free apps for mobile devices as well as websites. Examples include Quit Guide from the CDC and smokefree.gov  What things can I do to make it easier to quit?    · Reach out to your family and friends for support and encouragement. Call telephone quitlines (9-981-QUIT-NOW), reach out to support groups, or work with a counselor for support.  · Ask people who smoke to avoid smoking around you.  · Avoid places that trigger you to smoke, such as bars, parties, or smoke-break areas at work.  · Spend time with people who do not smoke.  · Lessen the stress in your life. Stress can be a smoking trigger  for some people. To lessen stress, try:  ? Exercising regularly.  ? Doing deep-breathing exercises.  ? Doing yoga.  ? Meditating.  ? Performing a body scan. This involves closing your eyes, scanning your body from head to toe, and noticing which parts of your body are particularly tense. Try to relax the muscles in those areas.  How will I feel when I quit smoking?  Day 1 to 3 weeks  Within the first 24 hours of quitting smoking, you may start to feel withdrawal symptoms. These symptoms are usually most noticeable 2-3 days after quitting, but they usually do not last for more than 2-3 weeks. You may experience these symptoms:  · Mood swings.  · Restlessness, anxiety, or irritability.  · Trouble concentrating.  · Dizziness.  · Strong cravings for sugary foods and nicotine.  · Mild weight gain.  · Constipation.  · Nausea.  · Coughing or a sore throat.  · Changes in how the medicines that you take for unrelated issues work in your body.  · Depression.  · Trouble sleeping (insomnia).  Week 3 and afterward  After the first 2-3 weeks of quitting, you may start to notice more positive results, such as:  · Improved sense of smell and taste.  · Decreased coughing and sore throat.  · Slower heart rate.  · Lower blood pressure.  · Clearer skin.  · The ability to breathe more easily.  · Fewer sick days.  Quitting smoking can be very challenging. Do not get discouraged if you are not successful the first time. Some people need to make many attempts to quit before they achieve long-term success. Do your best to stick to your quit plan, and talk with your health care provider if you have any questions or concerns.  Summary  · Smoking tobacco is the leading cause of preventable death. Quitting smoking is one of the best things that you can do for your health.  · When you decide to quit smoking, create a plan to help you succeed.  · Quit smoking right away, not slowly over a period of time.  · When you start quitting, seek help from  your health care provider, family, or friends.  This information is not intended to replace advice given to you by your health care provider. Make sure you discuss any questions you have with your health care provider.  Document Revised: 09/11/2020 Document Reviewed: 03/07/2020  Elsevier Patient Education © 2020 [employer]

## 2021-02-25 LAB
25(OH)D3 SERPL-MCNC: 34.2 NG/ML (ref 30–100)
PTH-INTACT SERPL-MCNC: 147 PG/ML (ref 15–65)

## 2021-03-01 ENCOUNTER — TELEPHONE (OUTPATIENT)
Dept: FAMILY MEDICINE CLINIC | Age: 71
End: 2021-03-01

## 2021-03-01 ENCOUNTER — TELEPHONE (OUTPATIENT)
Dept: NEUROSURGERY | Facility: CLINIC | Age: 71
End: 2021-03-01

## 2021-03-01 NOTE — TELEPHONE ENCOUNTER
Jennifer with Dr Mariaelena Dia at [address] will be having neck surgery on March 9th . He is having his Preop physical on March 2nd at [facility] . They will fax all of that to us . Question is do you want to see him before surgery ?

## 2021-03-01 NOTE — TELEPHONE ENCOUNTER
RECEIVED CALL FROM [facility] OFFICE.     PCP WOULD LIKE TO SEE PATIENT IN OFFICE TO GIVE SURGICAL CLEARANCE.     THEY HAVE MADE APPT FOR 3/3/21 @ 10:30 AM AND ASKED US TO FAX PREWORK [phone].     ATTEMPTED TO NOTIFY PT, MOBILE GOES STRAIGHT TO [employer] [facility]. LEFT MESSAGE WITH DATE AND TIME.     TRIED HOME PHONE BUT [employer] MA[employer] IS NOT SET UP.     IF PT CALLS BACK OK FOR HUB TO LET HIM KNOW THAT HE HAS AN APPOINTMENT WITH PCP ON 3/3/21 @ 10:30 AM FOR SURGERY CLEARANCE.

## 2021-03-02 ENCOUNTER — HOSPITAL ENCOUNTER (OUTPATIENT)
Dept: GENERAL RADIOLOGY | Facility: HOSPITAL | Age: 71
Discharge: HOME | End: 2021-03-02

## 2021-03-02 ENCOUNTER — HOSPITAL ENCOUNTER (OUTPATIENT)
Dept: BONE DENSITY | Facility: HOSPITAL | Age: 71
Discharge: HOME | End: 2021-03-02

## 2021-03-02 ENCOUNTER — APPOINTMENT (OUTPATIENT)
Dept: PREADMISSION TESTING | Facility: HOSPITAL | Age: 71
End: 2021-03-02

## 2021-03-02 VITALS
OXYGEN SATURATION: 99 % | WEIGHT: 149.6 LBS | SYSTOLIC BLOOD PRESSURE: 149 MMHG | HEART RATE: 57 BPM | HEIGHT: 67 IN | DIASTOLIC BLOOD PRESSURE: 88 MMHG | BODY MASS INDEX: 23.48 KG/M2 | RESPIRATION RATE: 16 BRPM

## 2021-03-02 DIAGNOSIS — G95.9 CERVICAL MYELOPATHY (HCC): ICD-10-CM

## 2021-03-02 DIAGNOSIS — Z72.0 TOBACCO ABUSE: ICD-10-CM

## 2021-03-02 DIAGNOSIS — M48.02 CERVICAL SPINAL STENOSIS: ICD-10-CM

## 2021-03-02 DIAGNOSIS — M80.08XA AGE-RELATED OSTEOPOROSIS WITH CURRENT PATHOLOGICAL FRACTURE, VERTEBRA(E), INITIAL ENCOUNTER FOR FRACTURE (HCC): ICD-10-CM

## 2021-03-02 PROCEDURE — 71046 X-RAY EXAM CHEST 2 VIEWS: CPT

## 2021-03-02 PROCEDURE — 93005 ELECTROCARDIOGRAM TRACING: CPT

## 2021-03-02 PROCEDURE — 93010 ELECTROCARDIOGRAM REPORT: CPT | Performed by: INTERNAL MEDICINE

## 2021-03-02 PROCEDURE — 77080 DXA BONE DENSITY AXIAL: CPT

## 2021-03-02 NOTE — DISCHARGE INSTRUCTIONS
DAY OF SURGERY INSTRUCTIONS        YOUR SURGEON: DAMION BOWEN    PROCEDURE: CERVICAL DISCECTOMY ANTERIOR WITH FUSION CERVICAL 3/4    DATE OF SURGERY: MARCH 9, 2021    ARRIVAL TIME: AS DIRECTED BY OFFICE    YOU MAY TAKE THE FOLLOWING MEDICATION(S) THE MORNING OF SURGERY WITH A SIP OF WATER: NONE      ALL OTHER HOME MEDICATION CHECK WITH YOUR PHYSICIAN      DO NOT TAKE ANY ERECTILE DYSFUNCTION MEDICATIONS (EX: CIALIS, VIAGRA) 24 HOURS PRIOR TO SURGERY                      MANAGING PAIN AFTER SURGERY    We know you are probably wondering what your pain will be like after surgery.  Following surgery it is unrealistic to expect you will not have pain.   Pain is how our bodies let us know that something is wrong or cautions us to be careful.  That said, our goal is to make your pain tolerable.    Methods we may use to treat your pain include (oral or IV medications, PCAs, epidurals, nerve blocks, etc.)   While some procedures require IV pain medications for a short time after surgery, transitioning to pain medications by mouth allows for better management of pain.   Your nurse will encourage you to take oral pain medications whenever possible.  IV medications work almost immediately, but only last a short while.  Taking medications by mouth allows for a more constant level of medication in your blood stream for a longer period of time.      Once your pain is out of control it is harder to get back under control.  It is important you are aware when your next dose of pain medication is due.  If you are admitted, your nurse may write the time of your next dose on the white board in your room to help you remember.      We are interested in your pain and encourage you to inform us about aggravating factors during your visit.   Many times a simple repositioning every few hours can make a big difference.    If your physician says it is okay, do not let your pain prevent you from getting out of bed. Be sure to call  your nurse for assistance prior to getting up so you do not fall.      Before surgery, please decide your tolerable pain goal.  These faces help describe the pain ratings we use on a 0-10 scale.   Be prepared to tell us your goal and whether or not you take pain or anxiety medications at home.          BEFORE YOU COME TO THE HOSPITAL  (Pre-op instructions)  • Do not eat, drink, smoke or chew gum after midnight the night before surgery.  This also includes no mints.  • Morning of surgery take only the medicines you have been instructed with a sip of water unless otherwise instructed  by your physician.  • Do not shave, wear makeup or dark nail polish.  • Remove all jewelry including rings.  • Leave anything you consider valuable at home.  • Leave your suitcase in the car until after your surgery.  • Bring the following with you if applicable:  o Picture ID and insurance, Medicare or Medicaid cards  o Co-pay/deductible required by insurance (cash, check, credit card)  o Copy of advance directive, living will or power-of- documents if not brought to PAT  o CPAP or BIPAP mask and tubing  o Relaxation aids ( book, magazine), etc.  o Hearing aids                        ON THE DAY OF SURGERY  · On the day of surgery check in at registration located at the main entrance of the hospital.   ? You will be registered and given a beeper with instructions where to wait in the main lobby.  ? When your beeper lights up and vibrates a member of the Outpatient Surgery staff will meet you at the double doors under the stair steps and escort you to your preoperative room.   · You may have cloth compression devices placed on your legs. These help to prevent blood clots and reduce swelling in your legs.  · An IV may be inserted into one of your veins.  · In the operating room, you may be given one or more of the following:  ? A medicine to help you relax (sedative).  ? A medicine to numb the area (local anesthetic).  ? A medicine  "to make you fall asleep (general anesthetic).  ? A medicine that is injected into an area of your body to numb everything below the injection site (regional anesthetic).  · Your surgical site will be marked or identified.  · You may be given an antibiotic through your IV to help prevent infection.  Contact a health care provider if you:  · Develop a fever of more than 100.4°F (38°C) or other feelings of illness during the 48 hours before your surgery.  · Have symptoms that get worse.  Have questions or concerns about your surgery    General Anesthesia/Surgery, Adult  General anesthesia is the use of medicines to make a person \"go to sleep\" (unconscious) for a medical procedure. General anesthesia must be used for certain procedures, and is often recommended for procedures that:  · Last a long time.  · Require you to be still or in an unusual position.  · Are major and can cause blood loss.  The medicines used for general anesthesia are called general anesthetics. As well as making you unconscious for a certain amount of time, these medicines:  · Prevent pain.  · Control your blood pressure.  · Relax your muscles.  Tell a health care provider about:  · Any allergies you have.  · All medicines you are taking, including vitamins, herbs, eye drops, creams, and over-the-counter medicines.  · Any problems you or family members have had with anesthetic medicines.  · Types of anesthetics you have had in the past.  · Any blood disorders you have.  · Any surgeries you have had.  · Any medical conditions you have.  · Any recent upper respiratory, chest, or ear infections.  · Any history of:  ? Heart or lung conditions, such as heart failure, sleep apnea, asthma, or chronic obstructive pulmonary disease (COPD).  ?  service.  ? Depression or anxiety.  · Any tobacco or drug use, including marijuana or alcohol use.  · Whether you are pregnant or may be pregnant.  What are the risks?  Generally, this is a safe procedure. " However, problems may occur, including:  · Allergic reaction.  · Lung and heart problems.  · Inhaling food or liquid from the stomach into the lungs (aspiration).  · Nerve injury.  · Air in the bloodstream, which can lead to stroke.  · Extreme agitation or confusion (delirium) when you wake up from the anesthetic.  · Waking up during your procedure and being unable to move. This is rare.  These problems are more likely to develop if you are having a major surgery or if you have an advanced or serious medical condition. You can prevent some of these complications by answering all of your health care provider's questions thoroughly and by following all instructions before your procedure.  General anesthesia can cause side effects, including:  · Nausea or vomiting.  · A sore throat from the breathing tube.  · Hoarseness.  · Wheezing or coughing.  · Shaking chills.  · Tiredness.  · Body aches.  · Anxiety.  · Sleepiness or drowsiness.  · Confusion or agitation.  RISKS AND COMPLICATIONS OF SURGERY  Your health care provider will discuss possible risks and complications with you before surgery. Common risks and complications include:    · Problems due to the use of anesthetics.  · Blood loss and replacement (does not apply to minor surgical procedures).  · Temporary increase in pain due to surgery.  · Uncorrected pain or problems that the surgery was meant to correct.  · Infection.  · New damage.    What happens before the procedure?    Medicines  Ask your health care provider about:  · Changing or stopping your regular medicines. This is especially important if you are taking diabetes medicines or blood thinners.  · Taking medicines such as aspirin and ibuprofen. These medicines can thin your blood. Do not take these medicines unless your health care provider tells you to take them.  · Taking over-the-counter medicines, vitamins, herbs, and supplements. Do not take these during the week before your procedure unless your  health care provider approves them.  General instructions  · Starting 3-6 weeks before the procedure, do not use any products that contain nicotine or tobacco, such as cigarettes and e-cigarettes. If you need help quitting, ask your health care provider.  · If you brush your teeth on the morning of the procedure, make sure to spit out all of the toothpaste.  · Tell your health care provider if you become ill or develop a cold, cough, or fever.  · If instructed by your health care provider, bring your sleep apnea device with you on the day of your surgery (if applicable).  · Ask your health care provider if you will be going home the same day, the following day, or after a longer hospital stay.  ? Plan to have someone take you home from the hospital or clinic.  ? Plan to have a responsible adult care for you for at least 24 hours after you leave the hospital or clinic. This is important.  What happens during the procedure?  · You will be given anesthetics through both of the following:  ? A mask placed over your nose and mouth.  ? An IV in one of your veins.  · You may receive a medicine to help you relax (sedative).  · After you are unconscious, a breathing tube may be inserted down your throat to help you breathe. This will be removed before you wake up.  · An anesthesia specialist will stay with you throughout your procedure. He or she will:  ? Keep you comfortable and safe by continuing to give you medicines and adjusting the amount of medicine that you get.  ? Monitor your blood pressure, pulse, and oxygen levels to make sure that the anesthetics do not cause any problems.  The procedure may vary among health care providers and hospitals.  What happens after the procedure?  · Your blood pressure, temperature, heart rate, breathing rate, and blood oxygen level will be monitored until the medicines you were given have worn off.  · You will wake up in a recovery area. You may wake up slowly.  · If you feel anxious  or agitated, you may be given medicine to help you calm down.  · If you will be going home the same day, your health care provider may check to make sure you can walk, drink, and urinate.  · Your health care provider will treat any pain or side effects you have before you go home.  · Do not drive for 24 hours if you were given a sedative.  Summary  · General anesthesia is used to keep you still and prevent pain during a procedure.  · It is important to tell your healthcare provider about your medical history and any surgeries you have had, and previous experience with anesthesia.  · Follow your healthcare provider’s instructions about when to stop eating, drinking, or taking certain medicines before your procedure.  · Plan to have someone take you home from the hospital or clinic.  This information is not intended to replace advice given to you by your health care provider. Make sure you discuss any questions you have with your health care provider.  Document Released: 03/26/2009 Document Revised: 08/03/2018 Document Reviewed: 08/03/2018  [employer] Interactive Patient Education © 2019 [employer]       Fall Prevention in Hospitals, Adult  As a hospital patient, your condition and the treatments you receive can increase your risk for falls. Some additional risk factors for falls in a hospital include:  · Being in an unfamiliar environment.  · Being on bed rest.  · Your surgery.  · Taking certain medicines.  · Your tubing requirements, such as intravenous (IV) therapy or catheters.  It is important that you learn how to decrease fall risks while at the hospital. Below are important tips that can help prevent falls.  SAFETY TIPS FOR PREVENTING FALLS  Talk about your risk of falling.  · Ask your health care provider why you are at risk for falling. Is it your medicine, illness, tubing placement, or something else?  · Make a plan with your health care provider to keep you safe from falls.  · Ask your health care  provider or pharmacist about side effects of your medicines. Some medicines can make you dizzy or affect your coordination.  Ask for help.  · Ask for help before getting out of bed. You may need to press your call button.  · Ask for assistance in getting safely to the toilet.  · Ask for a walker or cane to be put at your bedside. Ask that most of the side rails on your bed be placed up before your health care provider leaves the room.  · Ask family or friends to sit with you.  · Ask for things that are out of your reach, such as your glasses, hearing aids, telephone, bedside table, or call button.  Follow these tips to avoid falling:  · Stay lying or seated, rather than standing, while waiting for help.  · Wear rubber-soled slippers or shoes whenever you walk in the hospital.  · Avoid quick, sudden movements.  ¨ Change positions slowly.  ¨ Sit on the side of your bed before standing.  ¨ Stand up slowly and wait before you start to walk.  · Let your health care provider know if there is a spill on the floor.  · Pay careful attention to the medical equipment, electrical cords, and tubes around you.  · When you need help, use your call button by your bed or in the bathroom. Wait for one of your health care providers to help you.  · If you feel dizzy or unsure of your footing, return to bed and wait for assistance.  · Avoid being distracted by the TV, telephone, or another person in your room.  · Do not lean or support yourself on rolling objects, such as IV poles or bedside tables.     This information is not intended to replace advice given to you by your health care provider. Make sure you discuss any questions you have with your health care provider.     Document Released: 12/15/2001 Document Revised: 01/08/2016 Document Reviewed: 08/25/2013  [employer] Interactive Patient Education ©2016 [employer]       [facility] 4% Patient Instruction Sheet    Chlorhexidine Before Surgery  Chlorhexidine  gluconate (CHG) is a germ-killing (antiseptic) solution that is used to clean the skin. It gets rid of the bacteria that normally live on the skin. Cleaning your skin with CHG before surgery helps lower the risk for infection after surgery.    How to use CHG solution  · You will take 2 showers, one shower the night before surgery, the second shower the morning of surgery before coming to the hospital.  · Use CHG only as told by your health care provider, and follow the instructions on the label.  · Use CHG solution while taking a shower. Follow these steps when using CHG solution (unless your health care provider gives you different instructions):  1. Start the shower.  2. Use your normal soap and shampoo to wash your face and hair.  3. Turn off the shower or move out of the shower stream.  4. Pour the CHG onto a clean washcloth. Do not use any type of brush or rough-edged sponge.  5. Starting at your neck, lather your body down to your toes. Make sure you:  6. Pay special attention to the part of your body where you will be having surgery. Scrub this area for at least 1 minute.  7. Use the full amount of CHG as directed. Usually, this is one half bottle for each shower.  8. Do not use CHG on your head or face. If the solution gets into your ears or eyes, rinse them well with water.  9. Avoid your genital area.  10. Avoid any areas of skin that have broken skin, cuts, or scrapes.  11. Scrub your back and under your arms. Make sure to wash skin folds.  12. Let the lather sit on your skin for 1-2 minutes or as long as told by your health care  provider.  13. Thoroughly rinse your entire body in the shower. Make sure that all body creases and crevices are rinsed well.  14. Dry off with a clean towel. Do not put any substances on your body afterward, such as powder, lotion, or perfume.  15. Put on clean clothes or pajamas.  16. If it is the night before your surgery, sleep in clean sheets.    What are the risks?  Risks  of using CHG include:  · A skin reaction.  · Hearing loss, if CHG gets in your ears.  · Eye injury, if CHG gets in your eyes and is not rinsed out.  · The CHG product catching fire.  Make sure that you avoid smoking and flames after applying CHG to your skin.  Do not use CHG:  · If you have a chlorhexidine allergy or have previously reacted to chlorhexidine.  · On babies younger than 2 months of age.      On the day of surgery, when you are taken to your room in Outpatient Surgery you will be given a CHG prepackaged cloth to wipe the site for your surgery.  How to use CHG prepackaged cloths  · Follow the instructions on the label.  · Use the CHG cloth on clean, dry skin. Follow these steps when using a CHG cloth (unless your health care provider gives you different instructions):  1. Using the CHG cloth, vigorously scrub the part of your body where you will be having surgery. Scrub using a back-and-forth motion for 3 minutes. The area on your body should be completely wet with CHG when you are finished scrubbing.  2. Do not rinse. Discard the cloth and let the area air-dry for 1 minute. Do not put any substances on your body afterward, such as powder, lotion, or perfume.  Contact a health care provider if:  · Your skin gets irritated after scrubbing.  · You have questions about using your solution or cloth.  Get help right away if:  · Your eyes become very red or swollen.  · Your eyes itch badly.  · Your skin itches badly and is red or swollen.  · Your hearing changes.  · You have trouble seeing.  · You have swelling or tingling in your mouth or throat.  · You have trouble breathing.  · You swallow any chlorhexidine.  Summary  · Chlorhexidine gluconate (CHG) is a germ-killing (antiseptic) solution that is used to clean the skin. Cleaning your skin with CHG before surgery helps lower the risk for infection after surgery.  · You may be given CHG to use at home. It may be in a bottle or in a prepackaged cloth to use on  your skin. Carefully follow your health care provider's instructions and the instructions on the product label.  · Do not use CHG if you have a chlorhexidine allergy.  · Contact your health care provider if your skin gets irritated after scrubbing.  This information is not intended to replace advice given to you by your health care provider. Make sure you discuss any questions you have with your health care provider.  Document Released: 09/11/2013 Document Revised: 11/15/2018 Document Reviewed: 11/15/2018  [employer] Interactive Patient Education © 2019 [employer]          PATIENT/FAMILY/RESPONSIBLE PARTY VERBALIZES UNDERSTANDING OF ABOVE EDUCATION.  COPY OF PAIN SCALE GIVEN AND REVIEWED WITH VERBALIZED UNDERSTANDING.

## 2021-03-03 ENCOUNTER — TRANSCRIBE ORDERS (OUTPATIENT)
Dept: ADMINISTRATIVE | Facility: HOSPITAL | Age: 71
End: 2021-03-03

## 2021-03-03 DIAGNOSIS — Z11.59 SCREENING FOR VIRAL DISEASE: Primary | ICD-10-CM

## 2021-03-03 LAB
QT INTERVAL: 406 MS
QTC INTERVAL: 385 MS

## 2021-03-04 ENCOUNTER — OFFICE VISIT (OUTPATIENT)
Dept: FAMILY MEDICINE CLINIC | Age: 71
End: 2021-03-04
Payer: COMMERCIAL

## 2021-03-04 VITALS
HEART RATE: 70 BPM | DIASTOLIC BLOOD PRESSURE: 80 MMHG | TEMPERATURE: 96.8 F | SYSTOLIC BLOOD PRESSURE: 132 MMHG | BODY MASS INDEX: 23.7 KG/M2 | WEIGHT: 151 LBS | HEIGHT: 67 IN | OXYGEN SATURATION: 98 %

## 2021-03-04 DIAGNOSIS — R79.89 ELEVATED PTHRP LEVEL: Primary | ICD-10-CM

## 2021-03-04 DIAGNOSIS — R79.89 ELEVATED PTHRP LEVEL: ICD-10-CM

## 2021-03-04 LAB
ANION GAP SERPL CALCULATED.3IONS-SCNC: 7 MMOL/L (ref 7–19)
BUN BLDV-MCNC: 11 MG/DL (ref 8–23)
CALCIUM SERPL-MCNC: 12.1 MG/DL (ref 8.8–10.2)
CHLORIDE BLD-SCNC: 104 MMOL/L (ref 98–111)
CO2: 27 MMOL/L (ref 22–29)
CREAT SERPL-MCNC: 0.9 MG/DL (ref 0.5–1.2)
GFR AFRICAN AMERICAN: >59
GFR NON-AFRICAN AMERICAN: >60
GLUCOSE BLD-MCNC: 97 MG/DL (ref 74–109)
PARATHYROID HORMONE INTACT: 158.3 PG/ML (ref 15–65)
POTASSIUM SERPL-SCNC: 4.1 MMOL/L (ref 3.5–5)
SODIUM BLD-SCNC: 138 MMOL/L (ref 136–145)

## 2021-03-04 PROCEDURE — 99215 OFFICE O/P EST HI 40 MIN: CPT | Performed by: NURSE PRACTITIONER

## 2021-03-04 PROCEDURE — 1123F ACP DISCUSS/DSCN MKR DOCD: CPT | Performed by: NURSE PRACTITIONER

## 2021-03-04 PROCEDURE — G8427 DOCREV CUR MEDS BY ELIG CLIN: HCPCS | Performed by: NURSE PRACTITIONER

## 2021-03-04 PROCEDURE — 3017F COLORECTAL CA SCREEN DOC REV: CPT | Performed by: NURSE PRACTITIONER

## 2021-03-04 PROCEDURE — 4040F PNEUMOC VAC/ADMIN/RCVD: CPT | Performed by: NURSE PRACTITIONER

## 2021-03-04 PROCEDURE — G8484 FLU IMMUNIZE NO ADMIN: HCPCS | Performed by: NURSE PRACTITIONER

## 2021-03-04 PROCEDURE — G8420 CALC BMI NORM PARAMETERS: HCPCS | Performed by: NURSE PRACTITIONER

## 2021-03-04 PROCEDURE — 4004F PT TOBACCO SCREEN RCVD TLK: CPT | Performed by: NURSE PRACTITIONER

## 2021-03-04 ASSESSMENT — PATIENT HEALTH QUESTIONNAIRE - PHQ9
1. LITTLE INTEREST OR PLEASURE IN DOING THINGS: 0
2. FEELING DOWN, DEPRESSED OR HOPELESS: 0
SUM OF ALL RESPONSES TO PHQ QUESTIONS 1-9: 0
SUM OF ALL RESPONSES TO PHQ QUESTIONS 1-9: 0

## 2021-03-04 NOTE — PROGRESS NOTES
and rhythm, S1, S2 normal, no murmur, rub or gallop   Abdomen:   Soft, non-tender, bowel sounds active all four quadrants,  no masses, no organomegaly   Genitalia:  Normal male   Rectal:  Normal tone, normal prostate, no masses or tenderness;  guaiac negative stool   Extremities: Extremities normal, atraumatic, no cyanosis or edema   Pulses: 2+ and symmetric   Skin: Skin color, texture, turgor normal, no rashes or lesions   Lymph nodes: Cervical, supraclavicular, and axillary nodes normal   Neurologic: Normal         Predictors of intubation difficulty:   Morbid obesity? no   Anatomically abnormal facies? no   Prominent incisors? no   Receding mandible? no   Short, thick neck? no   Neck range of motion: abnormal   Mallampati score: I (soft palate, uvula, fauces, tonsillar pillars visible) has dentures      Cardiographics  ECG: normal sinus rhythm, no blocks or conduction defects, no ischemic changes  Echocardiogram: not done    Imaging  Chest X-Ray: normal and chronic lung changes no infiltrates     Lab Review   No visits with results within 2 Month(s) from this visit.    Latest known visit with results is:   Orders Only on 08/21/2019   Component Date Value    PSA 08/21/2019 3.37     Cholesterol, Total 08/21/2019 196     Triglycerides 08/21/2019 93     HDL 08/21/2019 41*    LDL Calculated 08/21/2019 136     T4 Free 08/21/2019 1.3     TSH 08/21/2019 1.030     Sodium 08/21/2019 139     Potassium 08/21/2019 4.2     Chloride 08/21/2019 105     CO2 08/21/2019 20*    Anion Gap 08/21/2019 14     Glucose 08/21/2019 122*    BUN 08/21/2019 13     CREATININE 08/21/2019 0.9     GFR Non- 08/21/2019 >60     Calcium 08/21/2019 10.9*    Total Protein 08/21/2019 7.3     Albumin 08/21/2019 4.3     Total Bilirubin 08/21/2019 0.4     Alkaline Phosphatase 08/21/2019 66     ALT 08/21/2019 14     AST 08/21/2019 19     WBC 08/21/2019 6.3     RBC 08/21/2019 4.74     Hemoglobin 08/21/2019 14.6     Hematocrit 08/21/2019 45.2     MCV 08/21/2019 95.4*    MCH 08/21/2019 30.8     MCHC 08/21/2019 32.3*    RDW 08/21/2019 13.9     Platelets 96/77/8660 202     MPV 08/21/2019 9.6     Neutrophils % 08/21/2019 62.5     Lymphocytes % 08/21/2019 25.2     Monocytes % 08/21/2019 8.9     Eosinophils % 08/21/2019 2.2     Basophils % 08/21/2019 1.0     Neutrophils Absolute 08/21/2019 3.9     Immature Granulocytes # 08/21/2019 0.0     Lymphocytes Absolute 08/21/2019 1.6     Monocytes Absolute 08/21/2019 0.60     Eosinophils Absolute 08/21/2019 0.10     Basophils Absolute 08/21/2019 0.10          Assessment:        Diagnosis Orders   1. Elevated PTHrP level  Basic Metabolic Panel    Calcitonin    Calcium, 24 HR Urine    PTH, Intact    CT SOFT TISSUE NECK W CONTRAST        78 y.o. male with planned surgery as above. Known risk factors for perioperative complications: None  2 drinks at night    Difficulty with intubation is not anticipated.     Cardiac Risk Estimation: per the Revised Cardiac Risk Index (Circ. 100:1043, 1999), the patient's risk factors for cardiac complications include none  Current medications which may produce withdrawal symptoms if withheld perioperatively: none     Plan:     I have reviewed the patient's labs chest x-ray bone DEXA scan MRI  Consulted with Dr. Balta Up for the elevated PT H and calcium level recommendations were to repeat levels add in a 24-hour urine calcium and CT soft tissue neck  I do not think that this will prevent him from having his surgery but expressed the need to work-up his elevated levels which he is agreeable to

## 2021-03-07 LAB — CALCITONIN LEVEL: 4.1 PG/ML (ref 0–7.5)

## 2021-03-08 ENCOUNTER — LAB (OUTPATIENT)
Dept: LAB | Facility: HOSPITAL | Age: 71
End: 2021-03-08

## 2021-03-08 DIAGNOSIS — R79.89 ELEVATED PTHRP LEVEL: ICD-10-CM

## 2021-03-08 LAB
24HR URINE VOLUME (ML): 1375 ML
CALCIUM 24 HOUR URINE: 437 MG/24HR (ref 100–300)
CREATININE 24 HOUR URINE: 1.1 G/24HR (ref 1–2)
Lab: 24 HOURS
SARS-COV-2 RNA RESP QL NAA+PROBE: NOT DETECTED

## 2021-03-08 PROCEDURE — C9803 HOPD COVID-19 SPEC COLLECT: HCPCS | Performed by: NEUROLOGICAL SURGERY

## 2021-03-08 PROCEDURE — U0003 INFECTIOUS AGENT DETECTION BY NUCLEIC ACID (DNA OR RNA); SEVERE ACUTE RESPIRATORY SYNDROME CORONAVIRUS 2 (SARS-COV-2) (CORONAVIRUS DISEASE [COVID-19]), AMPLIFIED PROBE TECHNIQUE, MAKING USE OF HIGH THROUGHPUT TECHNOLOGIES AS DESCRIBED BY CMS-2020-01-R: HCPCS | Performed by: NEUROLOGICAL SURGERY

## 2021-03-08 PROCEDURE — U0005 INFEC AGEN DETEC AMPLI PROBE: HCPCS | Performed by: NEUROLOGICAL SURGERY

## 2021-03-09 ENCOUNTER — APPOINTMENT (OUTPATIENT)
Dept: GENERAL RADIOLOGY | Facility: HOSPITAL | Age: 71
End: 2021-03-09

## 2021-03-09 ENCOUNTER — HOSPITAL ENCOUNTER (OUTPATIENT)
Facility: HOSPITAL | Age: 71
Discharge: HOME | End: 2021-03-10
Attending: NEUROLOGICAL SURGERY | Admitting: NEUROLOGICAL SURGERY

## 2021-03-09 ENCOUNTER — ANESTHESIA EVENT (OUTPATIENT)
Dept: PERIOP | Facility: HOSPITAL | Age: 71
End: 2021-03-09

## 2021-03-09 ENCOUNTER — ANESTHESIA (OUTPATIENT)
Dept: PERIOP | Facility: HOSPITAL | Age: 71
End: 2021-03-09

## 2021-03-09 DIAGNOSIS — G95.9 CERVICAL MYELOPATHY (HCC): ICD-10-CM

## 2021-03-09 DIAGNOSIS — Z74.09 IMPAIRED MOBILITY: ICD-10-CM

## 2021-03-09 DIAGNOSIS — M48.02 CERVICAL SPINAL STENOSIS: ICD-10-CM

## 2021-03-09 LAB
ABO GROUP BLD: NORMAL
BLD GP AB SCN SERPL QL: NEGATIVE
RH BLD: POSITIVE
T&S EXPIRATION DATE: NORMAL

## 2021-03-09 PROCEDURE — 72040 X-RAY EXAM NECK SPINE 2-3 VW: CPT

## 2021-03-09 PROCEDURE — C1889 IMPLANT/INSERT DEVICE, NOC: HCPCS | Performed by: NEUROLOGICAL SURGERY

## 2021-03-09 PROCEDURE — 63710000001 BUPROPION 75 MG TABLET: Performed by: NURSE PRACTITIONER

## 2021-03-09 PROCEDURE — 25010000002 CEFAZOLIN PER 500 MG: Performed by: NEUROLOGICAL SURGERY

## 2021-03-09 PROCEDURE — C1713 ANCHOR/SCREW BN/BN,TIS/BN: HCPCS | Performed by: NEUROLOGICAL SURGERY

## 2021-03-09 PROCEDURE — 25010000002 FENTANYL CITRATE (PF) 250 MCG/5ML SOLUTION: Performed by: NURSE ANESTHETIST, CERTIFIED REGISTERED

## 2021-03-09 PROCEDURE — 86901 BLOOD TYPING SEROLOGIC RH(D): CPT | Performed by: NEUROLOGICAL SURGERY

## 2021-03-09 PROCEDURE — A9270 NON-COVERED ITEM OR SERVICE: HCPCS | Performed by: NURSE PRACTITIONER

## 2021-03-09 PROCEDURE — 22551 ARTHRD ANT NTRBDY CERVICAL: CPT | Performed by: NEUROLOGICAL SURGERY

## 2021-03-09 PROCEDURE — 25010000002 ONDANSETRON PER 1 MG: Performed by: NURSE ANESTHETIST, CERTIFIED REGISTERED

## 2021-03-09 PROCEDURE — 25010000002 PROPOFOL 10 MG/ML EMULSION: Performed by: NURSE ANESTHETIST, CERTIFIED REGISTERED

## 2021-03-09 PROCEDURE — 0: Performed by: NEUROLOGICAL SURGERY

## 2021-03-09 PROCEDURE — 25010000002 HEPARIN (PORCINE) PER 1000 UNITS: Performed by: NURSE PRACTITIONER

## 2021-03-09 PROCEDURE — 25010000002 DEXAMETHASONE PER 1 MG: Performed by: ANESTHESIOLOGY

## 2021-03-09 PROCEDURE — 63710000001 ACETAMINOPHEN 500 MG TABLET: Performed by: ANESTHESIOLOGY

## 2021-03-09 PROCEDURE — 63710000001 DOCUSATE SODIUM 100 MG CAPSULE: Performed by: NURSE PRACTITIONER

## 2021-03-09 PROCEDURE — 25010000002 FENTANYL CITRATE (PF) 100 MCG/2ML SOLUTION: Performed by: ANESTHESIOLOGY

## 2021-03-09 PROCEDURE — 25010000002 HYDROMORPHONE PER 4 MG: Performed by: ANESTHESIOLOGY

## 2021-03-09 PROCEDURE — 22845 INSERT SPINE FIXATION DEVICE: CPT | Performed by: NEUROLOGICAL SURGERY

## 2021-03-09 PROCEDURE — 76000 FLUOROSCOPY <1 HR PHYS/QHP: CPT

## 2021-03-09 PROCEDURE — 63710000001 ACETAMINOPHEN 325 MG TABLET: Performed by: NURSE PRACTITIONER

## 2021-03-09 PROCEDURE — 25010000002 CEFAZOLIN PER 500 MG: Performed by: NURSE PRACTITIONER

## 2021-03-09 PROCEDURE — 22853 INSJ BIOMECHANICAL DEVICE: CPT | Performed by: NEUROLOGICAL SURGERY

## 2021-03-09 PROCEDURE — 99024 POSTOP FOLLOW-UP VISIT: CPT | Performed by: NURSE PRACTITIONER

## 2021-03-09 PROCEDURE — 94799 UNLISTED PULMONARY SVC/PX: CPT

## 2021-03-09 PROCEDURE — A9270 NON-COVERED ITEM OR SERVICE: HCPCS | Performed by: ANESTHESIOLOGY

## 2021-03-09 PROCEDURE — 25010000002 DEXAMETHASONE PER 1 MG: Performed by: NURSE ANESTHETIST, CERTIFIED REGISTERED

## 2021-03-09 PROCEDURE — 86900 BLOOD TYPING SEROLOGIC ABO: CPT | Performed by: NEUROLOGICAL SURGERY

## 2021-03-09 PROCEDURE — 63710000001 SENNOSIDES-DOCUSATE 8.6-50 MG TABLET: Performed by: NURSE PRACTITIONER

## 2021-03-09 PROCEDURE — 86850 RBC ANTIBODY SCREEN: CPT | Performed by: NEUROLOGICAL SURGERY

## 2021-03-09 DEVICE — DBM T43103 2.5CC GRAFTON PUTTY
Type: IMPLANTABLE DEVICE | Site: SPINE CERVICAL | Status: FUNCTIONAL
Brand: GRAFTON®AND GRAFTON PLUS®DEMINERALIZED BONE MATRIX (DBM)

## 2021-03-09 DEVICE — HEMOST ABS SURGIFOAM SZ100 8X12 10MM: Type: IMPLANTABLE DEVICE | Site: SPINE CERVICAL | Status: FUNCTIONAL

## 2021-03-09 DEVICE — CAGE 5030641 ANATOMIC PTC 14X11X6MM
Type: IMPLANTABLE DEVICE | Site: SPINE CERVICAL | Status: FUNCTIONAL
Brand: ANATOMIC PEEK PTC CERVICAL FUSION SYSTEM

## 2021-03-09 DEVICE — PLATE 3001019 ZEVO 19MM 1 LVL
Type: IMPLANTABLE DEVICE | Site: SPINE CERVICAL | Status: FUNCTIONAL
Brand: ZEVO™ ANTERIOR CERVICAL PLATE SYSTEM

## 2021-03-09 DEVICE — KT HEMOST ABS SURGIFOAM PORCN 1GRAM: Type: IMPLANTABLE DEVICE | Site: SPINE CERVICAL | Status: FUNCTIONAL

## 2021-03-09 RX ORDER — SODIUM CHLORIDE 0.9 % (FLUSH) 0.9 %
3 SYRINGE (ML) INJECTION AS NEEDED
Status: DISCONTINUED | OUTPATIENT
Start: 2021-03-09 | End: 2021-03-09 | Stop reason: HOSPADM

## 2021-03-09 RX ORDER — SODIUM CHLORIDE 0.9 % (FLUSH) 0.9 %
3-10 SYRINGE (ML) INJECTION AS NEEDED
Status: DISCONTINUED | OUTPATIENT
Start: 2021-03-09 | End: 2021-03-09 | Stop reason: HOSPADM

## 2021-03-09 RX ORDER — ACETAMINOPHEN 500 MG
1000 TABLET ORAL ONCE
Status: COMPLETED | OUTPATIENT
Start: 2021-03-09 | End: 2021-03-09

## 2021-03-09 RX ORDER — DEXAMETHASONE SODIUM PHOSPHATE 4 MG/ML
INJECTION, SOLUTION INTRA-ARTICULAR; INTRALESIONAL; INTRAMUSCULAR; INTRAVENOUS; SOFT TISSUE AS NEEDED
Status: DISCONTINUED | OUTPATIENT
Start: 2021-03-09 | End: 2021-03-09 | Stop reason: SURG

## 2021-03-09 RX ORDER — ACETAMINOPHEN 160 MG/5ML
650 SOLUTION ORAL EVERY 4 HOURS PRN
Status: DISCONTINUED | OUTPATIENT
Start: 2021-03-09 | End: 2021-03-10 | Stop reason: HOSPADM

## 2021-03-09 RX ORDER — SODIUM CHLORIDE, SODIUM LACTATE, POTASSIUM CHLORIDE, CALCIUM CHLORIDE 600; 310; 30; 20 MG/100ML; MG/100ML; MG/100ML; MG/100ML
100 INJECTION, SOLUTION INTRAVENOUS CONTINUOUS
Status: DISCONTINUED | OUTPATIENT
Start: 2021-03-09 | End: 2021-03-09

## 2021-03-09 RX ORDER — MAGNESIUM HYDROXIDE 1200 MG/15ML
LIQUID ORAL AS NEEDED
Status: DISCONTINUED | OUTPATIENT
Start: 2021-03-09 | End: 2021-03-09 | Stop reason: HOSPADM

## 2021-03-09 RX ORDER — DIAZEPAM 2 MG/1
2 TABLET ORAL EVERY 6 HOURS
Status: DISCONTINUED | OUTPATIENT
Start: 2021-03-09 | End: 2021-03-10 | Stop reason: HOSPADM

## 2021-03-09 RX ORDER — SODIUM CHLORIDE, SODIUM LACTATE, POTASSIUM CHLORIDE, CALCIUM CHLORIDE 600; 310; 30; 20 MG/100ML; MG/100ML; MG/100ML; MG/100ML
1000 INJECTION, SOLUTION INTRAVENOUS CONTINUOUS
Status: DISCONTINUED | OUTPATIENT
Start: 2021-03-09 | End: 2021-03-09

## 2021-03-09 RX ORDER — SODIUM CHLORIDE 0.9 % (FLUSH) 0.9 %
10 SYRINGE (ML) INJECTION AS NEEDED
Status: DISCONTINUED | OUTPATIENT
Start: 2021-03-09 | End: 2021-03-10 | Stop reason: HOSPADM

## 2021-03-09 RX ORDER — ACETAMINOPHEN 650 MG/1
650 SUPPOSITORY RECTAL EVERY 4 HOURS PRN
Status: DISCONTINUED | OUTPATIENT
Start: 2021-03-09 | End: 2021-03-10 | Stop reason: HOSPADM

## 2021-03-09 RX ORDER — DOCUSATE SODIUM 100 MG/1
100 CAPSULE, LIQUID FILLED ORAL 2 TIMES DAILY
Status: DISCONTINUED | OUTPATIENT
Start: 2021-03-09 | End: 2021-03-10 | Stop reason: HOSPADM

## 2021-03-09 RX ORDER — ROCURONIUM BROMIDE 10 MG/ML
INJECTION, SOLUTION INTRAVENOUS AS NEEDED
Status: DISCONTINUED | OUTPATIENT
Start: 2021-03-09 | End: 2021-03-09 | Stop reason: SURG

## 2021-03-09 RX ORDER — FENTANYL CITRATE 50 UG/ML
INJECTION, SOLUTION INTRAMUSCULAR; INTRAVENOUS AS NEEDED
Status: DISCONTINUED | OUTPATIENT
Start: 2021-03-09 | End: 2021-03-09 | Stop reason: SURG

## 2021-03-09 RX ORDER — LABETALOL HYDROCHLORIDE 5 MG/ML
5 INJECTION, SOLUTION INTRAVENOUS
Status: DISCONTINUED | OUTPATIENT
Start: 2021-03-09 | End: 2021-03-09 | Stop reason: HOSPADM

## 2021-03-09 RX ORDER — OXYCODONE AND ACETAMINOPHEN 10; 325 MG/1; MG/1
1 TABLET ORAL EVERY 4 HOURS PRN
Status: DISCONTINUED | OUTPATIENT
Start: 2021-03-09 | End: 2021-03-10 | Stop reason: HOSPADM

## 2021-03-09 RX ORDER — MIDAZOLAM HYDROCHLORIDE 1 MG/ML
0.5 INJECTION INTRAMUSCULAR; INTRAVENOUS
Status: DISCONTINUED | OUTPATIENT
Start: 2021-03-09 | End: 2021-03-09 | Stop reason: HOSPADM

## 2021-03-09 RX ORDER — SODIUM CHLORIDE 0.9 % (FLUSH) 0.9 %
10 SYRINGE (ML) INJECTION EVERY 12 HOURS SCHEDULED
Status: DISCONTINUED | OUTPATIENT
Start: 2021-03-09 | End: 2021-03-10 | Stop reason: HOSPADM

## 2021-03-09 RX ORDER — ONDANSETRON 4 MG/1
4 TABLET, FILM COATED ORAL EVERY 6 HOURS PRN
Status: DISCONTINUED | OUTPATIENT
Start: 2021-03-09 | End: 2021-03-10 | Stop reason: HOSPADM

## 2021-03-09 RX ORDER — SODIUM CHLORIDE 0.9 % (FLUSH) 0.9 %
3 SYRINGE (ML) INJECTION EVERY 12 HOURS SCHEDULED
Status: DISCONTINUED | OUTPATIENT
Start: 2021-03-09 | End: 2021-03-09 | Stop reason: HOSPADM

## 2021-03-09 RX ORDER — SODIUM CHLORIDE 0.9 % (FLUSH) 0.9 %
10 SYRINGE (ML) INJECTION AS NEEDED
Status: DISCONTINUED | OUTPATIENT
Start: 2021-03-09 | End: 2021-03-09 | Stop reason: HOSPADM

## 2021-03-09 RX ORDER — BUPROPION HYDROCHLORIDE 75 MG/1
75 TABLET ORAL 2 TIMES DAILY
Status: DISCONTINUED | OUTPATIENT
Start: 2021-03-09 | End: 2021-03-10 | Stop reason: HOSPADM

## 2021-03-09 RX ORDER — KETAMINE HYDROCHLORIDE 50 MG/ML
INJECTION, SOLUTION, CONCENTRATE INTRAMUSCULAR; INTRAVENOUS AS NEEDED
Status: DISCONTINUED | OUTPATIENT
Start: 2021-03-09 | End: 2021-03-09 | Stop reason: SURG

## 2021-03-09 RX ORDER — LIDOCAINE HYDROCHLORIDE 10 MG/ML
0.5 INJECTION, SOLUTION EPIDURAL; INFILTRATION; INTRACAUDAL; PERINEURAL ONCE AS NEEDED
Status: DISCONTINUED | OUTPATIENT
Start: 2021-03-09 | End: 2021-03-09 | Stop reason: HOSPADM

## 2021-03-09 RX ORDER — LIDOCAINE HYDROCHLORIDE 20 MG/ML
INJECTION, SOLUTION EPIDURAL; INFILTRATION; INTRACAUDAL; PERINEURAL AS NEEDED
Status: DISCONTINUED | OUTPATIENT
Start: 2021-03-09 | End: 2021-03-09 | Stop reason: SURG

## 2021-03-09 RX ORDER — MIDAZOLAM HYDROCHLORIDE 1 MG/ML
1 INJECTION INTRAMUSCULAR; INTRAVENOUS
Status: DISCONTINUED | OUTPATIENT
Start: 2021-03-09 | End: 2021-03-09 | Stop reason: HOSPADM

## 2021-03-09 RX ORDER — HEPARIN SODIUM 5000 [USP'U]/ML
5000 INJECTION, SOLUTION INTRAVENOUS; SUBCUTANEOUS EVERY 12 HOURS SCHEDULED
Status: DISCONTINUED | OUTPATIENT
Start: 2021-03-09 | End: 2021-03-10 | Stop reason: HOSPADM

## 2021-03-09 RX ORDER — BUPIVACAINE HCL/0.9 % NACL/PF 0.1 %
2 PLASTIC BAG, INJECTION (ML) EPIDURAL EVERY 8 HOURS
Status: DISCONTINUED | OUTPATIENT
Start: 2021-03-09 | End: 2021-03-10 | Stop reason: HOSPADM

## 2021-03-09 RX ORDER — BUPIVACAINE HCL/0.9 % NACL/PF 0.1 %
2 PLASTIC BAG, INJECTION (ML) EPIDURAL ONCE
Status: COMPLETED | OUTPATIENT
Start: 2021-03-09 | End: 2021-03-09

## 2021-03-09 RX ORDER — ONDANSETRON 2 MG/ML
INJECTION INTRAMUSCULAR; INTRAVENOUS AS NEEDED
Status: DISCONTINUED | OUTPATIENT
Start: 2021-03-09 | End: 2021-03-09 | Stop reason: SURG

## 2021-03-09 RX ORDER — OXYCODONE AND ACETAMINOPHEN 10; 325 MG/1; MG/1
1 TABLET ORAL ONCE AS NEEDED
Status: DISCONTINUED | OUTPATIENT
Start: 2021-03-09 | End: 2021-03-09 | Stop reason: HOSPADM

## 2021-03-09 RX ORDER — FENTANYL CITRATE 50 UG/ML
25 INJECTION, SOLUTION INTRAMUSCULAR; INTRAVENOUS
Status: DISCONTINUED | OUTPATIENT
Start: 2021-03-09 | End: 2021-03-09 | Stop reason: HOSPADM

## 2021-03-09 RX ORDER — FAMOTIDINE 20 MG/1
40 TABLET, FILM COATED ORAL DAILY
Status: DISCONTINUED | OUTPATIENT
Start: 2021-03-09 | End: 2021-03-10 | Stop reason: HOSPADM

## 2021-03-09 RX ORDER — NALOXONE HCL 0.4 MG/ML
0.04 VIAL (ML) INJECTION AS NEEDED
Status: DISCONTINUED | OUTPATIENT
Start: 2021-03-09 | End: 2021-03-09 | Stop reason: HOSPADM

## 2021-03-09 RX ORDER — OXYCODONE AND ACETAMINOPHEN 7.5; 325 MG/1; MG/1
1 TABLET ORAL EVERY 4 HOURS PRN
Status: DISCONTINUED | OUTPATIENT
Start: 2021-03-09 | End: 2021-03-10 | Stop reason: HOSPADM

## 2021-03-09 RX ORDER — FLUMAZENIL 0.1 MG/ML
0.2 INJECTION INTRAVENOUS AS NEEDED
Status: DISCONTINUED | OUTPATIENT
Start: 2021-03-09 | End: 2021-03-09 | Stop reason: HOSPADM

## 2021-03-09 RX ORDER — VARENICLINE TARTRATE 1 MG/1
1 TABLET, FILM COATED ORAL 2 TIMES DAILY
Status: DISCONTINUED | OUTPATIENT
Start: 2021-03-09 | End: 2021-03-10 | Stop reason: HOSPADM

## 2021-03-09 RX ORDER — ONDANSETRON 2 MG/ML
4 INJECTION INTRAMUSCULAR; INTRAVENOUS AS NEEDED
Status: DISCONTINUED | OUTPATIENT
Start: 2021-03-09 | End: 2021-03-09 | Stop reason: HOSPADM

## 2021-03-09 RX ORDER — AMOXICILLIN 250 MG
2 CAPSULE ORAL 2 TIMES DAILY
Status: DISCONTINUED | OUTPATIENT
Start: 2021-03-09 | End: 2021-03-10 | Stop reason: HOSPADM

## 2021-03-09 RX ORDER — ONDANSETRON 2 MG/ML
4 INJECTION INTRAMUSCULAR; INTRAVENOUS EVERY 6 HOURS PRN
Status: DISCONTINUED | OUTPATIENT
Start: 2021-03-09 | End: 2021-03-10 | Stop reason: HOSPADM

## 2021-03-09 RX ORDER — DEXAMETHASONE SODIUM PHOSPHATE 4 MG/ML
4 INJECTION, SOLUTION INTRA-ARTICULAR; INTRALESIONAL; INTRAMUSCULAR; INTRAVENOUS; SOFT TISSUE ONCE AS NEEDED
Status: COMPLETED | OUTPATIENT
Start: 2021-03-09 | End: 2021-03-09

## 2021-03-09 RX ORDER — ACETAMINOPHEN 325 MG/1
650 TABLET ORAL EVERY 4 HOURS PRN
Status: DISCONTINUED | OUTPATIENT
Start: 2021-03-09 | End: 2021-03-10 | Stop reason: HOSPADM

## 2021-03-09 RX ORDER — HYDROMORPHONE HYDROCHLORIDE 1 MG/ML
0.5 INJECTION, SOLUTION INTRAMUSCULAR; INTRAVENOUS; SUBCUTANEOUS
Status: DISCONTINUED | OUTPATIENT
Start: 2021-03-09 | End: 2021-03-09 | Stop reason: HOSPADM

## 2021-03-09 RX ADMIN — KETAMINE HYDROCHLORIDE 50 MG: 50 INJECTION, SOLUTION INTRAMUSCULAR; INTRAVENOUS at 12:41

## 2021-03-09 RX ADMIN — DEXAMETHASONE SODIUM PHOSPHATE 4 MG: 4 INJECTION, SOLUTION INTRAMUSCULAR; INTRAVENOUS at 11:28

## 2021-03-09 RX ADMIN — SODIUM CHLORIDE, POTASSIUM CHLORIDE, SODIUM LACTATE AND CALCIUM CHLORIDE: 600; 310; 30; 20 INJECTION, SOLUTION INTRAVENOUS at 14:23

## 2021-03-09 RX ADMIN — SODIUM CHLORIDE, PRESERVATIVE FREE 10 ML: 5 INJECTION INTRAVENOUS at 21:27

## 2021-03-09 RX ADMIN — FENTANYL CITRATE 50 MCG: 50 INJECTION, SOLUTION INTRAMUSCULAR; INTRAVENOUS at 12:59

## 2021-03-09 RX ADMIN — CEFAZOLIN SODIUM 2 G: 10 INJECTION, POWDER, FOR SOLUTION INTRAVENOUS at 21:27

## 2021-03-09 RX ADMIN — ACETAMINOPHEN 1000 MG: 500 TABLET, FILM COATED ORAL at 11:28

## 2021-03-09 RX ADMIN — DEXAMETHASONE SODIUM PHOSPHATE 4 MG: 4 INJECTION, SOLUTION INTRAMUSCULAR; INTRAVENOUS at 12:31

## 2021-03-09 RX ADMIN — FENTANYL CITRATE 50 MCG: 50 INJECTION, SOLUTION INTRAMUSCULAR; INTRAVENOUS at 12:48

## 2021-03-09 RX ADMIN — FENTANYL CITRATE 50 MCG: 50 INJECTION, SOLUTION INTRAMUSCULAR; INTRAVENOUS at 12:35

## 2021-03-09 RX ADMIN — HYDROMORPHONE HYDROCHLORIDE 0.5 MG: 1 INJECTION, SOLUTION INTRAMUSCULAR; INTRAVENOUS; SUBCUTANEOUS at 15:21

## 2021-03-09 RX ADMIN — LIDOCAINE HYDROCHLORIDE 100 MG: 20 INJECTION, SOLUTION EPIDURAL; INFILTRATION; INTRACAUDAL; PERINEURAL at 12:24

## 2021-03-09 RX ADMIN — HEPARIN SODIUM 5000 UNITS: 5000 INJECTION, SOLUTION INTRAVENOUS; SUBCUTANEOUS at 21:27

## 2021-03-09 RX ADMIN — DOCUSATE SODIUM 100 MG: 100 CAPSULE ORAL at 21:28

## 2021-03-09 RX ADMIN — Medication 2 G: at 12:28

## 2021-03-09 RX ADMIN — SODIUM CHLORIDE, POTASSIUM CHLORIDE, SODIUM LACTATE AND CALCIUM CHLORIDE: 600; 310; 30; 20 INJECTION, SOLUTION INTRAVENOUS at 11:50

## 2021-03-09 RX ADMIN — ACETAMINOPHEN 650 MG: 325 TABLET, FILM COATED ORAL at 21:44

## 2021-03-09 RX ADMIN — PROPOFOL 75 MCG/KG/MIN: 10 INJECTION, EMULSION INTRAVENOUS at 12:31

## 2021-03-09 RX ADMIN — SODIUM CHLORIDE, POTASSIUM CHLORIDE, SODIUM LACTATE AND CALCIUM CHLORIDE 1000 ML: 600; 310; 30; 20 INJECTION, SOLUTION INTRAVENOUS at 10:43

## 2021-03-09 RX ADMIN — FENTANYL CITRATE 50 MCG: 50 INJECTION, SOLUTION INTRAMUSCULAR; INTRAVENOUS at 12:24

## 2021-03-09 RX ADMIN — DOCUSATE SODIUM 50 MG AND SENNOSIDES 8.6 MG 2 TABLET: 8.6; 5 TABLET, FILM COATED ORAL at 21:28

## 2021-03-09 RX ADMIN — KETAMINE HYDROCHLORIDE 50 MG: 50 INJECTION, SOLUTION INTRAMUSCULAR; INTRAVENOUS at 13:39

## 2021-03-09 RX ADMIN — BUPROPION HYDROCHLORIDE 75 MG: 75 TABLET, FILM COATED ORAL at 21:28

## 2021-03-09 RX ADMIN — GLYCOPYRROLATE 0.2 MG: 0.2 INJECTION, SOLUTION INTRAMUSCULAR; INTRAVENOUS at 13:15

## 2021-03-09 RX ADMIN — ROCURONIUM BROMIDE 25 MG: 10 INJECTION INTRAVENOUS at 12:25

## 2021-03-09 RX ADMIN — HYDROMORPHONE HYDROCHLORIDE 0.5 MG: 1 INJECTION, SOLUTION INTRAMUSCULAR; INTRAVENOUS; SUBCUTANEOUS at 15:32

## 2021-03-09 RX ADMIN — FENTANYL CITRATE 50 MCG: 50 INJECTION, SOLUTION INTRAMUSCULAR; INTRAVENOUS at 12:25

## 2021-03-09 RX ADMIN — ONDANSETRON HYDROCHLORIDE 4 MG: 2 SOLUTION INTRAMUSCULAR; INTRAVENOUS at 14:11

## 2021-03-09 RX ADMIN — SODIUM CHLORIDE, POTASSIUM CHLORIDE, SODIUM LACTATE AND CALCIUM CHLORIDE 1000 ML: 600; 310; 30; 20 INJECTION, SOLUTION INTRAVENOUS at 10:40

## 2021-03-09 RX ADMIN — FENTANYL CITRATE 25 MCG: 50 INJECTION, SOLUTION INTRAMUSCULAR; INTRAVENOUS at 15:26

## 2021-03-09 NOTE — PLAN OF CARE
Problem: Adult Inpatient Plan of Care  Goal: Patient-Specific Goal (Individualized)  Outcome: Ongoing, Progressing  Flowsheets (Taken 3/9/2021 8699)  Patient-Specific Goals (Include Timeframe): Pain controlled with PO pain medications  Individualized Care Needs: C-collar on at all times   Goal Outcome Evaluation:     Progress: no change  Outcome Summary: A&OX4. Admitted from PACU. VSS. CPOX in place, O2 @ 3L at this time. SCDS for VTE prevention. C-collar in place. Denies n/t. Incision intact. DTV. Bed alarm on. Safety maintained.

## 2021-03-09 NOTE — PROGRESS NOTES
"NEUROSURGERY DAILY PROGRESS NOTE    ASSESSMENT:   Eduard Tam is a 70 y.o. with a significant medical history of tobacco abuse with a 50-pack-year history.  He presents today with a new problem of minimal neck pain, primarily numbness and tingling to the bilateral upper extremities worsening with upward gaze, 10% neck, 90% arms.  ROSMERY: 18.  mJOA: 15/18.  Physical exam findings of decreased bilateral  strengths since last visit, left abducens nerve palsy, left bicept 4/5, gross hyperreflexia without Fanny's, 2-3 beats of clonus noted on the right, and a normal gait. No radiology results for the last 30 days.     Past Medical History:   Diagnosis Date   • Chronic neck pain    • Numbness and tingling of upper extremity     when neck turns certain positions     Active Hospital Problems    Diagnosis    • Cervical spinal stenosis    • Cervical myelopathy (CMS/HCC)      PLAN:   Neuro: Stable.  Neuro intact/at baseline   POD # 0 (3/9/2021) ACDF C3-4   Cervical collar in place, trachea midline   Neurochecks every 4 hours     CV: No acute issues.   Pulm: Maintaining O2 sat.  Continuous pulse oximetry.  Incentive spirometry.   : Remove Pollock ASAP, bladder scans and I/O cath per policy.   FEN: Regular diet.  Advance as tolerated.   GI: No acute issues.    ID: 23-hour postoperative prophylactic antibiotics.    Heme:  DVT prophylaxis; SCD's  Pain: Tolerable at present with oral meds.     Dispo: PT/OT.       CHIEF COMPLAINT:   Bilateral upper extremity numbness and tingling    Subjective  Symptom stable.  Doing well    Temp:  [97.7 °F (36.5 °C)] 97.7 °F (36.5 °C)  Heart Rate:  [60-65] 60  Resp:  [16-20] 16  BP: (159)/(87) 159/87    Objective:  General Appearance:  Well-appearing and in no acute distress.    Vital signs: (most recent): Blood pressure 130/70, pulse 71, temperature 98.2 °F (36.8 °C), temperature source Temporal, resp. rate 16, height 170 cm (66.93\"), weight 67.8 kg (149 lb 7.6 oz), SpO2 97 %.  "       Neurologic Exam     Mental Status   Oriented to person, place, and time.   Attention: normal. Concentration: normal.   Speech: speech is normal   Level of consciousness: alert    Alert and oriented x3.  Follows commands without prompting showing thumbs up into fingers bilaterally     Cranial Nerves     CN II   Visual fields full to confrontation.     CN III, IV, VI   Pupils are equal, round, and reactive to light.  Extraocular motions are normal.     CN V   Facial sensation intact.     CN VII   Facial expression full, symmetric.     CN VIII   CN VIII normal.     CN IX, X   CN IX normal.     CN XI   CN XI normal.     Motor Exam   Right arm tone: normal  Left arm tone: normal  Right leg tone: normal  Left leg tone: normal    Strength   Right deltoid: 5/5  Left deltoid: 5/5  Right biceps: 5/5  Left biceps: 5/5  Right triceps: 5/5  Left triceps: 5/5  Right wrist extension: 5/5  Left wrist extension: 5/5  Right iliopsoas: 5/5  Left iliopsoas: 5/5  Right quadriceps: 5/5  Left quadriceps: 5/5  Right anterior tibial: 5/5  Left anterior tibial: 5/5  Right gastroc: 5/5  Left gastroc: 5/5  Right EHL 5/5  Left EHL 5/5       Sensory Exam   Right arm light touch: normal  Left arm light touch: normal  Right leg light touch: normal  Left leg light touch: normal    Gait, Coordination, and Reflexes     Tremor   Resting tremor: absent  Intention tremor: absent  Action tremor: absent    Drains: * No LDAs found *    Imaging Results (Last 24 Hours)     Procedure Component Value Units Date/Time    XR Spine Cervical 2 View [[id number]] Resulted: 03/09/21 1435     Updated: 03/09/21 1436    FL C Arm During Surgery [[id number]] Resulted: 03/09/21 1435     Updated: 03/09/21 1436        Lab Results (last 24 hours)     ** No results found for the last 24 hours. **          [id number]  Haroldo Cintron, APRN

## 2021-03-09 NOTE — OP NOTE
NEUROSURGERY OPERATIVE NOTE    Eduard Tam  OR Date: 3/9/2021    Pre-op Diagnosis:   Cervical myelopathy (CMS/HCC) [G95.9]  Cervical spinal stenosis [M48.02]    Post-op Diagnosis:     Post-Op Diagnosis Codes:     * Cervical myelopathy (CMS/HCC) [G95.9]     * Cervical spinal stenosis [M48.02]          Surgeon(s):  Shaun Solis MD    Anesthesia: General    Staff:   Circulator: Hayes Smith RN; Erica Coronel RN; Amanda Lopez RN  Scrub Person: Nohelia Tavarez; Sarah Alfred  Assistant: Claudia Pena    Procedure(s):  CERVICAL DISCECTOMY ANTERIOR WITH FUSION, Cervical 3/4    OPERATIVE PROCEDURES  Arthrodesis  1. Anterior interbody fusion, with discectomy and decompression  Interbody Cage  2.  insertion of interbody biomechanical device  Anterior Instrumentation/Plating  3.  2-3 vertebral body plate  Application of bone graft  4.  allograft, morselized, or placement of osteo-promoted material    OPERATIVE INDICATIONS:   Eduard Tam is a 70 y.o. male with a significant medical history of tobacco abuse with a 50-pack-year history.  He presents today with a new problem of minimal neck pain, primarily numbness and tingling to the bilateral upper extremities worsening with upward gaze, 10% neck, 90% arms.  ROSMERY: 18.  mJOA: 15/18.  Physical exam findings of decreased bilateral  strengths since last visit, left abducens nerve palsy, left bicept 4/5, gross hyperreflexia without Fanny's, 2-3 beats of clonus noted on the right, and a normal gait. No radiology results for the last 30 days.       We reviewed the surgery itself as well as risks including infection, injury to blood vessels or nerves, leakage of spinal fluid, weakness or paralysis, failure of the pain to improve, possible worsening of the pain, failure of the neurologic symptoms to improve, possible worsening of the neurologic symptoms, and possible need for further surgery including re-revision and/or removal.  Furthermore I  explained that the fusion may not become solid or that the hardware could break. We discussed various techniques available for obtaining fusion including anterior and posterior approaches and I recommended allograft and plate fixation. Furthermore, I explained that removing motion at the fusion sites will transfer stress to other disc levels possibly accelerating their degeneration and causing additional symptoms and/or necessitating additional surgery in the future.    OPERATIVE TECHNIQUE:   On the day of surgery, the patient was brought to the preoperative holding area where IV access was obtained. Prophylactic intravenous antibiotics were administered. The patient was then brought to the major operative suite. While on the [facility], the patient underwent an uneventful induction of general anesthetic with placement of endotracheal tube. TEDs, SCD hoses, and a Pollock catheter were applied.      He was then positioned supine on the operating table, and all bony prominences were padded. The arms were carefully padded and tucked at the patient's sides. Baseline neuro monitoring was obtained.  A jaw bra with 5lbs of cervical traction was applied and neuro monitoring was compared to baseline and found to be stable.    The skin was prepped with alcohol and lateral fluoroscopy was used to identify the approximate incision site over C3/4.  The entire neck was then sterilely prepped and draped in the usual fashion with DuraPrep and the skin was infused with quarter percent Marcaine with epinephrine.    A transverse skin incision was made and carried down to the platysma muscle. This was then split in line with its fibers. Blunt dissection was carried down medial to the carotid sheath and lateral to the trachea and esophagus until the anterior cervical was visualized. A bayoneted spinal needle was placed into a disc and fluoroscopy confirmed C3/4 by counting down from C2.      The longus colli muscles were then  elevated bilaterally with the Bovie cautery. Self-retaining retractors were placed deep to the longus colli muscle in an effort to avoid injury to the sympathetic chain.  Lapoint pins were then placed mid vertebral body at C3 and C4 and distracted. Anterior discectomy was performed at C3/4. This included complete removal of the anterior annulus, nucleus, and posterior annulus.  A high-speed drill was then used to drill off posterior osteophytes.  The posterior longitudinal ligament was removed with angled curette and 1 mm Kerrison. Foraminotomies were then accomplished bilaterally with 2 mm Kerrisons. Once all of this was accomplished, the blunt-tip probe was used to check for any residual compression.     The superior and inferior endplates were prepared with a 3 mm high-speed side cutting bur. Bleeding cancellous bone was exposed.  Disc space trials were then inserted into the distracted disc space and lateral fluoroscopy used to confirm appropriate size.  Precut and milled PEEK cage packed with allograft was gently tapped into place with lateral fluoroscopy confirming no impingement on the central canal and good juxtaposition against the bleeding decorticated surfaces without distraction of the facets.  Lapoint post distraction was then removed.      Once all graft placements had been performed, an appropriate size Medtronic anterior cervical locking plate was chosen and bent into gentle lordosis. Two screws were then placed into each of the vertebral bodies at C3 and 4 using fluoroscopy. There was excellent purchase. A final x-ray was done confirming good position of the hardware and grafts. The locking mechanism was then applied.    Following a final copious irrigation, there was good hemostasis and no dural leaks. The carotid pulse was strong.     The wounds were then closed in layers using 3-0 Vicryl suture for the platysma muscle and subcutaneous tissue, and 4-0 Monocryl suture in a subcuticular skin closure.  Dermabond was applied to the wound. The drain was hooked to bulb suction.     A hard cervical collar was applied.     The patient was then carefully returned to their hospital bed where the patient was reversed and extubated and taken to the recovery room having tolerated the procedure well.  All monitoring was noted to be stable throughout the case.      Estimated Blood Loss: minimal    Complications: None.  Neuromonitoring remained stable.     Implants:   Implant Name Type Inv. Item Serial No.  Lot No. LRB No. Used Action   HEMOST ABS SURGIFOAM  8X12 10MM - [id number] Implant HEMOST ABS SURGIFOAM  8X12 10MM  [employer]  DIV [employer] J AND J [id number] N/A 1 Implanted   KT HEMOST ABS SURGIFOAM PORCN 1GRAM - [id number] Implant KT HEMOST ABS SURGIFOAM PORCN 1GRAM  [employer]  DIV OF J AND J [id number] N/A 1 Implanted   PUTTY DBM CRISTO 2.5CC - [id number] Implant PUTTY DBM CRISTO 2.5CC  [employer] [id number] N/A 1 Implanted   CAGE CERV ANATOMIC PTC [id number] - [id number] Implant CAGE CERV ANATOMIC PTC [id number]  MED[employer]ON[employer] [id number] N/A 1 Implanted   PLT CERV ZEVO 1LVL 19MM - [id number] Implant PLT CERV ZEVO 1LVL 19MM  [employer]  N/A 1 Implanted   SCRW ZEVO 2THRD SD VA 3.5X15MM - [id number] Implant SCRW ZEVO 2THRD SD VA 3.5X15MM  [employer]  N/A 4 Implanted       Specimens:                None      Drains: * No LDAs found *

## 2021-03-09 NOTE — ANESTHESIA PROCEDURE NOTES
Airway  Urgency: elective    Date/Time: 3/9/2021 12:28 PM  Airway not difficult    General Information and Staff    Patient location during procedure: OR    Indications and Patient Condition  Indications for airway management: airway protection    Preoxygenated: yes  MILS maintained throughout  Mask difficulty assessment: 2 - vent by mask + OA or adjuvant +/- NMBA    Final Airway Details  Final airway type: endotracheal airway      Successful airway: ETT  Cuffed: yes   Successful intubation technique: video laryngoscopy  Facilitating devices/methods: intubating stylet  Endotracheal tube insertion site: oral  Blade: Josesito  Blade size: 3  ETT size (mm): 7.5  Cormack-Lehane Classification: grade I - full view of glottis  Placement verified by: chest auscultation and capnometry   Measured from: lips  Number of attempts at approach: 1  Assessment: lips, teeth, and gum same as pre-op and atraumatic intubation

## 2021-03-09 NOTE — ANESTHESIA POSTPROCEDURE EVALUATION
"Patient: Eduard Tam    Procedure Summary     Date: 03/09/21 Room / Location:  [facility] OR  /  [facility] OR    Anesthesia Start: 1220 Anesthesia Stop: 1456    Procedure: CERVICAL DISCECTOMY ANTERIOR WITH FUSION, Cervical 3/4 (N/A Spine Cervical) Diagnosis:       Cervical myelopathy (CMS/HCC)      Cervical spinal stenosis      (Cervical myelopathy (CMS/HCC) [G95.9])      (Cervical spinal stenosis [M48.02])    Surgeons: Shaun Solis MD Provider: Stu Sawant CRNA    Anesthesia Type: general ASA Status: 2          Anesthesia Type: general    Vitals  Vitals Value Taken Time   /84 03/09/21 1603   Temp 98 °F (36.7 °C) 03/09/21 1603   Pulse 71 03/09/21 1603   Resp 18 03/09/21 1603   SpO2 96 % 03/09/21 1603           Post Anesthesia Care and Evaluation    Patient location during evaluation: PACU  Patient participation: complete - patient participated  Level of consciousness: awake and alert  Pain management: adequate  Airway patency: patent  Anesthetic complications: No anesthetic complications    Cardiovascular status: acceptable  Respiratory status: acceptable  Hydration status: acceptable    Comments: Blood pressure 133/84, pulse 71, temperature 98 °F (36.7 °C), temperature source Oral, resp. rate 18, height 170 cm (66.93\"), weight 67.8 kg (149 lb 7.6 oz), SpO2 96 %.    Pt discharged from PACU based on lobito score >8      "

## 2021-03-09 NOTE — DISCHARGE INSTRUCTIONS
[facility] Neurosurgery    Postoperative care following spine surgery  Dear Patient,  You have recently undergone spine surgery (anterior cervical discectomy and fusion C3-4) and are now ready to go home. These written instructions are intended to help you to recover quickly.  • If you have ANY QUESTIONS about your condition prior to discharge please ask Dr. Solis. In particular, if you have concerns about going home discuss them now. We do not want you to go until you are completely comfortable leaving the hospital.   • If you have ANY QUESTIONS about your condition after you go home call your doctor. The number is [phone] which is answered 24 hours a day. During regular working hours a  will connect you to your doctor, one of his partners, or one of our nurses. At night or on weekends the answering service will connect you with the physician on call. DO NOT HESITATE to call. We want to help you with any problems.     Deep vein thrombosis/ pulmonary embolus  Some patients who undergo surgery develop blood clots in the veins of the legs. These clots can cause pain or swelling in the legs, or may cause no obvious problem. They can break free from the legs and travel to the lungs causing shortness of breath and/or chest pain.you develop pain or swelling in your legs after surgery, call your doctor. If you develop breathing problems or chest pain after surgery, call 911.    Neurological Deficit  Neurological deficits are problems with brain function like speech difficulty, weakness, numbness, imbalance, etc. These deficits may be present before or after spine surgery. Prior to discharge your doctor will make sure that all treatment needed to help you recover from such deficits has been instituted. He will also make sure that these deficits are stable or improving. After you go home, if you think any of your spine problems are getting worse, not better, it may be a sign of bleeding, infection, or  other problems. Call your doctor. He will order tests and prescribe treatment as needed.    Activity Restrictions  In general after surgery you will be on restricted activities for several weeks to several months depending on the nature of your operation. For six weeks you should avoid heavy lifting (over 8 lbs or roughly a gallon of milk), bending, or stooping. You may be released by Dr. Solis earlier. You may return to driving when you feel comfortable enough that you can safely handle your vehicle AND you are not taking narcotic pain medications. This may be as early as 10 - 14 days, but could be longer as instructed by your neurosurgeon. After surgery you may gradually increase your activities, as you are able to tolerate. Walking is an excellent low impact exercise to begin after surgery. You should try to make regular walks of 15 to 30 minutes part of your postoperative recovery as you become able to do so. It typically requires a period of days to a few weeks to reach this level of activity and should be done in a gradual fashion. Your return to work will depend on your job requirements. In general, you may return to light duty work as soon as you feel comfortable and are not taking routine narcotic pain medications.    Medication  It is important to take your medication EXACTLY as prescribed. Some patients are reluctant to take pain medication. It is perfectly fine to take pain medication for several weeks after surgery. We want to eliminate pain whenever possible. Many pain medications can cause nausea (sick to your stomach), constipation (inability to poop), or itching. Nausea may be minimized by taking the medication with food. Constipation can be relieved by taking stool softeners and/ or laxatives that you can purchase over the counter as needed.    It is important to realize that no pain after surgery is an unrealistic expectation.  Pain medication will never reduce your pain score to zero.  The  goal of pain medicine is to reduce your pain to the point you can move, take care of yourself, and participate in therapy.  Make sure to work with your caregiver to determine what is an adequate level of pain control to promote healthy movement and then take your medication to reach this goal.      Please taper your pain medications to avoid long term addiction.  Example...  Week 1: Take your pain medication no more than ever 4 hours as needed for severe pain.  Week 2: Take your pain medication no more than ever 6 hours as needed for severe pain.  Week 3: Take your pain medication no more than ever 8 hours as needed for severe pain.  Week 4: Take your pain medication no more than ever 12 hours as needed for severe pain.  Week 5: Take your pain medication no more than ever 24 hours as needed for severe pain.  By Week 6 you should be off of all pain medication.     If you are near completion of your pain medications and feel that you require additional medications for pain, please notify the neurosurgical team 2-3 days before running out of pain medications.    Wound Care  Your incision is held together with dissolvable sutures that do not need to be removed.     Seventy-two hours after surgery it is OK to get the wound wet, so you can take a shower or bath.     You do not need to put any medication (like Neosporin or Vitamin E) on the wound. Scrubbing the wound should be avoided until the staples nondissolvable sutures come out.     Heating pads have the potential to cause very serious burns, especially in patients using narcotic pain medications (e.g. Oxycodone, Oxycontin, etc.) Do not use heating pads during your recovery.      No nicotine products, including second-hand smoke, gum or patches. Nicotine will delay healing and increase the likelihood of a surgical complication. For help quitting, call the Quitline: 1-704.862.6007     Potential wound problems include the following:  • Infection--If the wound becomes  red, tender, swollen, or warm it may be infected. Infection is often accompanied by fever. If you think your wound might be infected you should call your doctor. Often you can send us a picture of the wound so we can better evaluate it.   • Drainage--Fluid should not drain from your wound. If it does, call your doctor. Colored fluid may indicate infection. Clear fluid may indicate leakage of spinal fluid.   • Dehiscence--If the wound does not heal properly it may open up along the staple line. This is called dehiscence. Call us immediately.   • Sutures--Occasionally, one of the buried threads (sutures) may work through the skin. If you think this has happened call your doctor.   • Swelling--Spinal fluid or blood may collect under the skin. This is usually harmless, but needs to be evaluated. Call your doctor.     How to contact your doctor  Dr. Solis and his team did your surgery and, therefore, are likely to know more about your condition than any other physicians. We are immediately available to help you with any problems after surgery. Please call us for any concerns at the following numbers:  • Doctor’s office:  [phone] (answered 24 hours a day)   • [facility] : [phone] (alternative emergency number for on-call neurosurgeon)     Specific instructions related to your surgery  Diet: no restrictions, eat a heart healthy diet. If you are diabetic, tightly controlling your blood sugar over the next 2 weeks is crucial in controlling infection.     Activity: as tolerated, no heavy lifting or strenuous exercise for at least 2 weeks.    Brace/collar instructions: Cervical collar should be worn at all times except while bathing.  Please shower daily allowing clean soapy water to cleanse wound.  If you have had a posterior cervical fusion, place a clean dry gauze over your incision before putting cervical collar on.    Please do not use NSAIDs (Ibuprofen, Toradol, etc) for 4 weeks  following spinal fusion, as this can prevent bone growth. Tylenol is acceptable as an over the counter pain management.     Follow up:   Follow up with with Haroldo LANDRUM in 2 weeks for post op wound check and with Dr. Solis in the neurosurgery clinic ([phone]) in 6 weeks.  X-rays of the cervical spine prior to arrival. We will schedule this appointment for you.            Sincerely,        Héctor Solis MD, PhD, MPH

## 2021-03-09 NOTE — ANESTHESIA PREPROCEDURE EVALUATION
Anesthesia Evaluation     Patient summary reviewed   no history of anesthetic complications:  NPO Solid Status: > 8 hours  NPO Liquid Status: > 8 hours           Airway   Mallampati: I  TM distance: >3 FB  Neck ROM: limited  Comment: Worsening neuropathy with extension  Dental          Pulmonary    (+) a smoker Current Smoked day of surgery,   (-) asthma, sleep apnea  Cardiovascular   Exercise tolerance: good (4-7 METS)    ECG reviewed    (-) hypertension, past MI, CAD, dysrhythmias, cardiac stents, hyperlipidemia      Neuro/Psych  (+) numbness (right hand),     (-) seizures, TIA, CVA  GI/Hepatic/Renal/Endo    (-) liver disease, no renal disease, diabetes    Musculoskeletal     (+) neck pain,   Abdominal    Substance History      OB/GYN          Other        ROS/Med Hx Other: hypercalcemia                  Anesthesia Plan    ASA 2     general     intravenous induction     Anesthetic plan, all risks, benefits, and alternatives have been provided, discussed and informed consent has been obtained with: patient.

## 2021-03-10 VITALS
HEIGHT: 67 IN | DIASTOLIC BLOOD PRESSURE: 65 MMHG | OXYGEN SATURATION: 92 % | WEIGHT: 149.47 LBS | TEMPERATURE: 97.7 F | HEART RATE: 55 BPM | RESPIRATION RATE: 16 BRPM | BODY MASS INDEX: 23.46 KG/M2 | SYSTOLIC BLOOD PRESSURE: 115 MMHG

## 2021-03-10 LAB
ALBUMIN SERPL-MCNC: 3.4 G/DL (ref 3.5–5.2)
ALBUMIN/GLOB SERPL: 1.3 G/DL
ALP SERPL-CCNC: 77 U/L (ref 39–117)
ALT SERPL W P-5'-P-CCNC: 11 U/L (ref 1–41)
ANION GAP SERPL CALCULATED.3IONS-SCNC: 6 MMOL/L (ref 5–15)
AST SERPL-CCNC: 22 U/L (ref 1–40)
BASOPHILS # BLD AUTO: 0.02 10*3/MM3 (ref 0–0.2)
BASOPHILS NFR BLD AUTO: 0.2 % (ref 0–1.5)
BILIRUB SERPL-MCNC: 0.4 MG/DL (ref 0–1.2)
BUN SERPL-MCNC: 12 MG/DL (ref 8–23)
BUN/CREAT SERPL: 11.8 (ref 7–25)
CALCIUM SPEC-SCNC: 11.8 MG/DL (ref 8.6–10.5)
CHLORIDE SERPL-SCNC: 104 MMOL/L (ref 98–107)
CO2 SERPL-SCNC: 25 MMOL/L (ref 22–29)
CREAT SERPL-MCNC: 1.02 MG/DL (ref 0.76–1.27)
DEPRECATED RDW RBC AUTO: 49 FL (ref 37–54)
EOSINOPHIL # BLD AUTO: 0.01 10*3/MM3 (ref 0–0.4)
EOSINOPHIL NFR BLD AUTO: 0.1 % (ref 0.3–6.2)
ERYTHROCYTE [DISTWIDTH] IN BLOOD BY AUTOMATED COUNT: 14.2 % (ref 12.3–15.4)
GFR SERPL CREATININE-BSD FRML MDRD: 73 ML/MIN/1.73
GLOBULIN UR ELPH-MCNC: 2.6 GM/DL
GLUCOSE SERPL-MCNC: 142 MG/DL (ref 65–99)
HCT VFR BLD AUTO: 40.7 % (ref 37.5–51)
HGB BLD-MCNC: 13.5 G/DL (ref 13–17.7)
IMM GRANULOCYTES # BLD AUTO: 0.03 10*3/MM3 (ref 0–0.05)
IMM GRANULOCYTES NFR BLD AUTO: 0.3 % (ref 0–0.5)
LYMPHOCYTES # BLD AUTO: 1.37 10*3/MM3 (ref 0.7–3.1)
LYMPHOCYTES NFR BLD AUTO: 14.3 % (ref 19.6–45.3)
MCH RBC QN AUTO: 30.9 PG (ref 26.6–33)
MCHC RBC AUTO-ENTMCNC: 33.2 G/DL (ref 31.5–35.7)
MCV RBC AUTO: 93.1 FL (ref 79–97)
MONOCYTES # BLD AUTO: 1.04 10*3/MM3 (ref 0.1–0.9)
MONOCYTES NFR BLD AUTO: 10.9 % (ref 5–12)
NEUTROPHILS NFR BLD AUTO: 7.09 10*3/MM3 (ref 1.7–7)
NEUTROPHILS NFR BLD AUTO: 74.2 % (ref 42.7–76)
NRBC BLD AUTO-RTO: 0 /100 WBC (ref 0–0.2)
PLATELET # BLD AUTO: 179 10*3/MM3 (ref 140–450)
PMV BLD AUTO: 9.7 FL (ref 6–12)
POTASSIUM SERPL-SCNC: 4.3 MMOL/L (ref 3.5–5.2)
PROT SERPL-MCNC: 6 G/DL (ref 6–8.5)
RBC # BLD AUTO: 4.37 10*6/MM3 (ref 4.14–5.8)
SODIUM SERPL-SCNC: 135 MMOL/L (ref 136–145)
WBC # BLD AUTO: 9.56 10*3/MM3 (ref 3.4–10.8)

## 2021-03-10 PROCEDURE — A9270 NON-COVERED ITEM OR SERVICE: HCPCS | Performed by: NURSE PRACTITIONER

## 2021-03-10 PROCEDURE — 97165 OT EVAL LOW COMPLEX 30 MIN: CPT

## 2021-03-10 PROCEDURE — 99024 POSTOP FOLLOW-UP VISIT: CPT | Performed by: NURSE PRACTITIONER

## 2021-03-10 PROCEDURE — 97161 PT EVAL LOW COMPLEX 20 MIN: CPT | Performed by: PHYSICAL THERAPIST

## 2021-03-10 PROCEDURE — 63710000001 SENNOSIDES-DOCUSATE 8.6-50 MG TABLET: Performed by: NURSE PRACTITIONER

## 2021-03-10 PROCEDURE — 63710000001 FAMOTIDINE 20 MG TABLET: Performed by: NURSE PRACTITIONER

## 2021-03-10 PROCEDURE — 25010000002 HEPARIN (PORCINE) PER 1000 UNITS: Performed by: NURSE PRACTITIONER

## 2021-03-10 PROCEDURE — 63710000001 OXYCODONE-ACETAMINOPHEN 10-325 MG TABLET: Performed by: NURSE PRACTITIONER

## 2021-03-10 PROCEDURE — 63710000001 BUPROPION 75 MG TABLET: Performed by: NURSE PRACTITIONER

## 2021-03-10 PROCEDURE — 63710000001 DOCUSATE SODIUM 100 MG CAPSULE: Performed by: NURSE PRACTITIONER

## 2021-03-10 PROCEDURE — 85025 COMPLETE CBC W/AUTO DIFF WBC: CPT | Performed by: NURSE PRACTITIONER

## 2021-03-10 PROCEDURE — 94799 UNLISTED PULMONARY SVC/PX: CPT

## 2021-03-10 PROCEDURE — 80053 COMPREHEN METABOLIC PANEL: CPT | Performed by: NURSE PRACTITIONER

## 2021-03-10 PROCEDURE — 25010000002 CEFAZOLIN PER 500 MG: Performed by: NURSE PRACTITIONER

## 2021-03-10 RX ORDER — AMOXICILLIN 250 MG
2 CAPSULE ORAL 2 TIMES DAILY
Qty: 120 TABLET | Refills: 0 | Status: SHIPPED | OUTPATIENT
Start: 2021-03-10 | End: 2021-04-09

## 2021-03-10 RX ORDER — HYDROCODONE BITARTRATE AND ACETAMINOPHEN 7.5; 325 MG/1; MG/1
TABLET ORAL
Qty: 63 TABLET | Refills: 0 | Status: SHIPPED | OUTPATIENT
Start: 2021-03-10 | End: 2021-05-10

## 2021-03-10 RX ORDER — DIAZEPAM 2 MG/1
2 TABLET ORAL EVERY 6 HOURS PRN
Qty: 40 TABLET | Refills: 0 | Status: SHIPPED | OUTPATIENT
Start: 2021-03-10 | End: 2021-03-20

## 2021-03-10 RX ADMIN — OXYCODONE HYDROCHLORIDE AND ACETAMINOPHEN 1 TABLET: 10; 325 TABLET ORAL at 10:08

## 2021-03-10 RX ADMIN — FAMOTIDINE 40 MG: 20 TABLET, FILM COATED ORAL at 08:47

## 2021-03-10 RX ADMIN — BUPROPION HYDROCHLORIDE 75 MG: 75 TABLET, FILM COATED ORAL at 08:47

## 2021-03-10 RX ADMIN — HEPARIN SODIUM 5000 UNITS: 5000 INJECTION, SOLUTION INTRAVENOUS; SUBCUTANEOUS at 08:47

## 2021-03-10 RX ADMIN — CEFAZOLIN SODIUM 2 G: 10 INJECTION, POWDER, FOR SOLUTION INTRAVENOUS at 04:21

## 2021-03-10 RX ADMIN — DOCUSATE SODIUM 100 MG: 100 CAPSULE ORAL at 08:47

## 2021-03-10 RX ADMIN — DOCUSATE SODIUM 50 MG AND SENNOSIDES 8.6 MG 2 TABLET: 8.6; 5 TABLET, FILM COATED ORAL at 08:47

## 2021-03-10 NOTE — DISCHARGE SUMMARY
DISCHARGE SUMMARY FROM HOSPITAL    Date of Discharge:  3/10/2021    Presenting Diagnosis/History of Present Illness  Cervical myelopathy (CMS/Formerly [facility]) [G95.9]  Cervical spinal stenosis [M48.02]    Medical History   Patient Active Problem List   Diagnosis   • Non-recurrent bilateral inguinal hernia without obstruction or gangrene   • Tobacco abuse   • Cervical myelopathy (CMS/HCC)   • Cervical spinal stenosis     Discharge Diagnosis/ Active Hospital Problems:   Active Hospital Problems    Diagnosis    • Cervical spinal stenosis    • Cervical myelopathy (CMS/HCC)      Hospital Course  Patient is a 70 y.o. male presented with a significant medical history of tobacco abuse with a 50-pack-year history.  He presents today with a new problem of minimal neck pain, primarily numbness and tingling to the bilateral upper extremities worsening with upward gaze, 10% neck, 90% arms.  ROSMERY: 18.  mJOA: 15/18.  Physical exam findings of decreased bilateral  strengths since last visit, left abducens nerve palsy, left bicept 4/5, gross hyperreflexia without Fanny's, 2-3 beats of clonus noted on the right, and a normal gait. No radiology results for the last 30 days.       On 3/9/2021 the patient was brought to the main operating room where he underwent an uneventful ACDF C3-4 per Dr. Solis.  Postoperatively he  did extremely well and his neurological exam was unchanged.  He was kept overnight in the hospital for close neurologic monitoring, airway observation, and for pain control.  He has been able to ambulate, tolerate oral diet, oral pain medication, and void.  The day after surgery he was evaluated by physical therapy and occupational therapy and deemed safe for discharge home with family.  He will be given an appropriate course of oral medication for pain control.  Additional instructions provided.    Procedures Performed  Procedure(s):  CERVICAL DISCECTOMY ANTERIOR WITH FUSION, Cervical 3/4     Consults:   Consults     No  orders found for last 30 day(s).        Pertinent Test Results:   XR chest 2 vw    Result Date: 3/2/2021  Chronic changes with associated hyperinflation. No evidence of acute cardiopulmonary process. This report was finalized on 03/02/2021 13:45 by Dr. Sukhdev Flanagan MD.    MRI Cervical Spine Without Contrast    Result Date: 2/22/2021  1. Reversal of normal cervical lordosis with grade 1 spondylolisthesis at C3/C4 and C4/C5. Moderate to severe central canal stenosis at the C3/C4 level with increased T2 signal in the cervical spinal cord at C4 favoring cord edema. Preliminary findings discussed with Haroldo Cintron in the neurosurgical office the morning of the exam. This report was finalized on 02/22/2021 10:12 by Dr. Evelyn Hughes MD.    FL C Arm During Surgery    Result Date: 3/9/2021  4 intraoperative fluoroscopic views are submitted. These images were obtained for procedural guidance during cervical fusion Please refer to the operative note for more details. Fluoroscopy time was 23 seconds with a dose of 1.2140 mGy. This report was finalized on 03/09/2021 15:02 by Dr. Valentin Bassett MD.    DEXA Bone Density Axial    Result Date: 3/2/2021   1. Osteopenia. Low bone mass. The bone density is between 1.0 and 2.5 standard deviations below the mean for a young adult woman. There is an increased risk of fracture in these patients.  This report was finalized on 03/02/2021 13:00 by Dr Jayden Jaimes, .    XR Spine Cervical 2 View    Result Date: 3/9/2021  1.. Status post ACDF at C3-C4 without evidence of complications. 2. Reversal of the normal cervical lordosis with advanced degenerative disc disease at C6-C7 and C7-T1. There is anterolisthesis of C4 on C5 stable from the previous study. This report was finalized on 03/09/2021 20:46 by Dr. Jordi Pro MD.    XR Spine Cervical 2 View    Result Date: 3/9/2021  4 intraoperative fluoroscopic views are submitted. These images were obtained for procedural guidance during  cervical fusion Please refer to the operative note for more details. Fluoroscopy time was 23 seconds with a dose of 1.2140 mGy. This report was finalized on 03/09/2021 15:02 by Dr. Valentin Bassett MD.    CBC:   Results from last 7 days   Lab Units 03/10/21  0626   WBC 10*3/mm3 9.56   HEMOGLOBIN g/dL 13.5   HEMATOCRIT % 40.7   PLATELETS 10*3/mm3 179     BMP:  Results from last 7 days   Lab Units 03/10/21  0628 03/04/21  1132   SODIUM mmol/L 135* 138   POTASSIUM mmol/L 4.3 4.1   CHLORIDE mmol/L 104 104   TOTAL CO2 mmol/L  --  27   CO2 mmol/L 25.0  --    BUN mg/dL 12 11   CREATININE mg/dL 1.02 0.9   GLUCOSE mg/dL 142* 97   CALCIUM mg/dL 11.8* 12.1*   ALT (SGPT) U/L 11  --      Condition on Discharge:  Stable    Vital Signs  Temp:  [97.4 °F (36.3 °C)-98.3 °F (36.8 °C)] 97.7 °F (36.5 °C)  Heart Rate:  [50-80] 55  Resp:  [16-20] 16  BP: (106-159)/(61-87) 115/65    Physical Exam:   Physical Exam  Vitals and nursing note reviewed.   Constitutional:       General: He is not in acute distress.     Appearance: Normal appearance. He is well-developed, well-groomed and normal weight. He is not ill-appearing, toxic-appearing or diaphoretic.   HENT:      Head: Normocephalic and atraumatic.        Right Ear: Hearing normal.      Left Ear: Hearing normal.   Eyes:      Extraocular Movements: EOM normal.      Conjunctiva/sclera: Conjunctivae normal.      Pupils: Pupils are equal, round, and reactive to light.   Neck:      Trachea: Trachea normal.   Cardiovascular:      Rate and Rhythm: Normal rate and regular rhythm.   Pulmonary:      Effort: Pulmonary effort is normal. No tachypnea, bradypnea, accessory muscle usage or respiratory distress.   Abdominal:      Palpations: Abdomen is soft.   Musculoskeletal:      Cervical back: Neck supple. No edema or erythema.   Skin:     General: Skin is warm and dry.   Neurological:      Mental Status: He is alert and oriented to person, place, and time.      GCS: GCS eye subscore is 4. GCS verbal  subscore is 5. GCS motor subscore is 6.   Psychiatric:         Speech: Speech normal.         Behavior: Behavior normal. Behavior is cooperative.        Neurologic Exam     Mental Status   Oriented to person, place, and time.   Attention: normal. Concentration: normal.   Speech: speech is normal   Level of consciousness: alert    Alert and oriented x3.  Follows commands without prompting showing thumbs up into fingers bilaterally     Cranial Nerves     CN II   Visual fields full to confrontation.     CN III, IV, VI   Pupils are equal, round, and reactive to light.  Extraocular motions are normal.     CN V   Facial sensation intact.     CN VII   Facial expression full, symmetric.     CN VIII   CN VIII normal.     CN IX, X   CN IX normal.     CN XI   CN XI normal.     Motor Exam   Right arm tone: normal  Left arm tone: normal  Right leg tone: normal  Left leg tone: normal    Strength   Right deltoid: 5/5  Left deltoid: 5/5  Right biceps: 5/5  Left biceps: 5/5  Right triceps: 5/5  Left triceps: 5/5  Right wrist extension: 5/5  Left wrist extension: 5/5  Right iliopsoas: 5/5  Left iliopsoas: 5/5  Right quadriceps: 5/5  Left quadriceps: 5/5  Right anterior tibial: 5/5  Left anterior tibial: 5/5  Right gastroc: 5/5  Left gastroc: 5/5  Right EHL 5/5  Left EHL 5/5       Sensory Exam   Right arm light touch: normal  Left arm light touch: normal  Right leg light touch: normal  Left leg light touch: normal    Gait, Coordination, and Reflexes     Tremor   Resting tremor: absent  Intention tremor: absent  Action tremor: absent    Discharge Disposition  Home or Self Care    Discharge Medications     Discharge Medications      New Medications      Instructions Start Date   diazePAM 2 MG tablet  Commonly known as: VALIUM   2 mg, Oral, Every 6 Hours PRN      HYDROcodone-acetaminophen 7.5-325 MG per tablet  Commonly known as: NORCO   1 tab PO Q 6 hr PRN x 1 wk,1 tab PO q 8 hr PRN x 1 wk,1 tab PO q 12 hr PRN x 1 wk       sennosides-docusate 8.6-50 MG per tablet  Commonly known as: PERICOLACE   2 tablets, Oral, 2 Times Daily         Continue These Medications      Instructions Start Date   buPROPion 75 MG tablet  Commonly known as: WELLBUTRIN   75 mg, Oral, 2 Times Daily      multivitamin tablet tablet   1 tablet, Oral, Daily      varenicline 1 MG tablet  Commonly known as: Chantix Continuing Month Blake   1 mg, Oral, 2 Times Daily           Discharge Diet:   Diet Instructions     Advance Diet As Tolerated           Activity at Discharge:   Activity Instructions     Activity as Tolerated      Bathing Restrictions      May shower 72 hours postoperatively.  No tub baths.  Showers only.  Allow clean soapy water to cleanse wound.  Do not scrub incision.    Type of Restriction: Bathing    Bathing Restrictions: No Tub Bath    Driving Restrictions      NO DRIVING WHILE WEARING CERVICAL COLLAR    Type of Restriction: Driving    Driving Restrictions: No Driving While Taking Narcotics    Gradually Increase Activity Until at Pre-Hospitalization Level      Lifting Restrictions      Type of Restriction: Lifting    Lifting Restrictions: Lifting Restriction (Indicate Limit)    Weight Limit (Pounds): 8    Length of Lifting Restriction: 2-3 WEEKS         Follow-up Appointments  No future appointments.  Additional Instructions for the Follow-ups that You Need to Schedule     Call MD With Problems / Concerns   As directed      Instructions: Call for worsening pain or a concern for a postoperative incision infection (increased incisional redness, swelling, warmth, or drainage/discharge).    Order Comments: Instructions: Call for worsening pain or a concern for a postoperative incision infection (increased incisional redness, swelling, warmth, or drainage/discharge).          Discharge Follow-up with Specified Provider: Dr. Solis; 6 Weeks   As directed      To: Dr. Solis    Follow Up: 6 Weeks    Follow Up Details: 8 WEEKS WITH XRAYS OF THE  CERVICAL SPINE         Discharge Follow-up with Specified Provider: DIALLO Anderson; 2 Weeks   As directed      To: DIALLO Anderson    Follow Up: 2 Weeks    Follow Up Details: Postop wound check         XR spine cervical 2 vw   Apr 19, 2021      Please schedule x-ray on same day as follow-up appointment with Dr. Solis.    Thank you,    DIALLO Iniguez    Please schedule x-ray on same day as follow-up appointment with Dr. Solis.    Thank you,    DIALLO Iniguez    Order Comments: Please schedule x-ray on same day as follow-up appointment with Dr. Solis.  Thank you,  DIALLO Iniguez     Exam reason: postop eval, ACDF C3/4    Does this patient have a diabetic monitoring/medication delivering device on?: No             Test Results Pending at Discharge  None     DIALLO Iniguez  03/10/21  08:39 CST    [id number]

## 2021-03-10 NOTE — THERAPY EVALUATION
Patient Name: Eduard Tam  : 1950    MRN: [id number]                              Today's Date: 3/10/2021       Admit Date: 3/9/2021    Visit Dx:     ICD-10-CM ICD-9-CM   1. Cervical myelopathy (CMS/HCC)  G95.9 721.1   2. Cervical spinal stenosis  M48.02 723.0   3. Impaired mobility  Z74.09 799.89     Patient Active Problem List   Diagnosis   • Non-recurrent bilateral inguinal hernia without obstruction or gangrene   • Tobacco abuse   • Cervical myelopathy (CMS/HCC)   • Cervical spinal stenosis     Past Medical History:   Diagnosis Date   • Chronic neck pain    • Numbness and tingling of upper extremity     when neck turns certain positions     Past Surgical History:   Procedure Laterality Date   • ANTERIOR CERVICAL DISCECTOMY W/ FUSION N/A 3/9/2021    Procedure: CERVICAL DISCECTOMY ANTERIOR WITH FUSION, Cervical 34;  Surgeon: Shaun Solis MD;  Location: [facility];  Service: Neurosurgery;  Laterality: N/A;   • INGUINAL HERNIA REPAIR       General Information     Row Name 03/10/21 0835 03/10/21 0750       Physical Therapy Time and Intention    Document Type  --  -SB (r) AA (t) SB (c)  evaluation s/p: cervical discectomy anterior with fusion, cervical 34 21  CC: neck pain with numbness and tingling to the bilateral upper extremities  -SB (r) AA (t) SB (c)    Mode of Treatment  --  -SB (r) AA (t) SB (c)  physical therapy  -SB (r) AA (t) SB (c)    Row Name 03/10/21 0835 03/10/21 0750       General Information    Patient Profile Reviewed  --  -SB (r) AA (t) SB (c)  yes  -SB (r) AA (t) SB (c)    Prior Level of Function  --  -SB (r) AA (t) SB (c)  independent:;community mobility;transfer;bed mobility;ADL's;driving;home management;gait  -SB (r) AA (t) SB (c)    Existing Precautions/Restrictions  --  -SB (r) AA (t) SB (c)  fall;spinal c-collar, worn at all time  -SB (r) AA (t) SB (c)    Barriers to Rehab  --  -SB (r) AA (t) SB (c)  none identified  -SB (r) AA (t) SB (c)    Row Name 03/10/21  08 03/10/21 [facility]0       Living Environment    Lives With  --  -SB (r) AA (t) SB (c)  spouse tub shower, no chiar or rails  -SB (r) AA (t) SB (c)    Row Name 03/10/21 08 03/10/21 [facility]0       Home Main Entrance    Number of Stairs, Main Entrance  --  -SB (r) AA (t) SB (c)  none  -SB (r) AA (t) SB (c)    Stair Railings, Main Entrance  --  -SB (r) AA (t) SB (c)  none  -SB (r) AA (t) SB (c)    Row Name 03/10/21 08 03/10/21 [facility]0       Stairs Within Home, Primary    Number of Stairs, Within Home, Primary  --  -SB (r) AA (t) SB (c)  none  -SB (r) AA (t) SB (c)    Stair Railings, Within Home, Primary  --  -SB (r) AA (t) SB (c)  none  -SB (r) AA (t) SB (c)    Row Name 03/10/21 08 03/10/21 [facility]0       Cognition    Orientation Status (Cognition)  --  -SB (r) AA (t) SB (c)  oriented x 4  -SB (r) AA (t) SB (c)    Row Name 03/10/21 [facility] 03/10/21 [facility]0       Safety Issues, Functional Mobility    Impairments Affecting Function (Mobility)  --  -SB (r) AA (t) SB (c)  endurance/activity tolerance  -SB (r) AA (t) SB (c)      User Key  (r) = Recorded By, (t) = Taken By, (c) = Cosigned By    Initials Name Provider Type    Fariba Angelo, PT DPT Physical Therapist    Jose Luis Martinez, PT Student PT Student        Mobility     Row Name 03/10/21 [facility]0          Bed Mobility    Bed Mobility  supine-sit  -SB (r) AA (t) SB (c)     Supine-Sit [place] (Bed Mobility)  standby assist  -SB (r) AA (t) SB (c)     Comment (Bed Mobility)  pt. sat up long sitting, instructed on log roll technique  -SB (r) AA (t) SB (c)     Row Name 03/10/21 [facility]0          Sit-Stand Transfer    Sit-Stand [place] (Transfers)  standby assist  -SB (r) AA (t) SB (c)     Row Name 03/10/21 0750          Gait/Stairs (Locomotion)    [place] Level (Gait)  standby assist  -SB (r) AA (t) SB (c)     Distance in Feet (Gait)  200'  -SB (r) AA (t) SB (c)     Deviations/Abnormal Patterns (Gait)  -- some deviation from straight line ambulation, self corrects  -SB  (r) AA (t) SB (c)       User Key  (r) = Recorded By, (t) = Taken By, (c) = Cosigned By    Initials Name Provider Type    Fariba Angelo, PT DPT Physical Therapist    Jose Luis Martinez, PT Student PT Student        Obj/Interventions     Row Name 03/10/21 [facility]0          Range of Motion Comprehensive    General Range of Motion  no range of motion deficits identified  -SB (r) AA (t) SB (c)     Row Name 03/10/21 [facility]0          Strength Comprehensive (MMT)    General Manual Muscle Testing (MMT) Assessment  no strength deficits identified  -SB (r) AA (t) SB (c)     Row Name 03/10/21 [facility]0          Balance    Balance Assessment  sitting static balance;standing static balance;standing dynamic balance  -SB (r) AA (t) SB (c)     Static Sitting Balance  WNL;unsupported;sitting, edge of bed  -SB (r) AA (t) SB (c)     Static Standing Balance  WNL;unsupported;standing  -SB (r) AA (t) SB (c)     Dynamic Standing Balance  WFL some deviation from straight line ambualation, self corrects. SBA  -SB (r) AA (t) SB (c)     Row Name 03/10/21 [facility]          Sensory Assessment (Somatosensory)    Sensory Assessment (Somatosensory)  sensation intact  -SB (r) AA (t) SB (c)       User Key  (r) = Recorded By, (t) = Taken By, (c) = Cosigned By    Initials Name Provider Type    Fariba Angelo, PT DPT Physical Therapist    Jose Luis Martinez, PT Student PT Student        Goals/Plan    No documentation.       Clinical Impression     Row Name 03/10/21 [facility]0          Pain    Additional Documentation  Pain Scale: Numbers Pre/Post-Treatment (Group)  -SB (r) AA (t) SB (c)     Row Name 03/10/21 [facility]0          Pain Scale: Numbers Pre/Post-Treatment    Pretreatment Pain Rating  4/10  -SB (r) AA (t) SB (c)     Posttreatment Pain Rating  4/10  -SB (r) AA (t) SB (c)     Pain Location - Orientation  incisional  -SB (r) AA (t) SB (c)     Pain Intervention(s)  Ambulation/increased activity;Repositioned  -SB (r) AA (t) SB (c)     Row Name 03/10/21 [facility]0           Plan of Care Review    Plan of Care Reviewed With  patient  -SB (r) AA (t) SB (c)     Progress  no change  -SB (r) AA (t) SB (c)     Outcome Summary  PT evaluation completed. Pt A&Ox4, presents s/p cervical discectomy anterior with fusion, cervical 3/4 03/09/21. Pt in fowlers upon arrival, on room air. Pt was independent in ADLs and mobility prior to hospital admission. Pt was edu on, verbalized compliance, and demonstrated compliance with spinal precautions and brace wear. Pt strength, sensation, and ROM is WNL. Pt performed bed mobility and ambulated with SBA. Pt ambulated 200’ with slight deviation from linear gait that he self corrects. Pt is complaint with spinal precautions and I have no concerns about him returning home. Recommend d/c home with assist. Please re-consult physical therapy if needed.  -SB (r) AA (t) SB (c)     Row Name 03/10/21 0756          Therapy Assessment/Plan (PT)    Patient/Family Therapy Goals Statement (PT)  go home  -SB (r) AA (t) SB (c)     Criteria for Skilled Interventions Met (PT)  no  -SB (r) AA (t) SB (c)     Predicted Duration of Therapy Intervention (PT)  --  -SB (r) AA (t) SB (c)     Row Name 03/10/21 0750          Vital Signs    Pre SpO2 (%)  91  -SB (r) AA (t) SB (c)     O2 Delivery Pre Treatment  room air  -SB (r) AA (t) SB (c)     O2 Delivery Intra Treatment  room air  -SB (r) AA (t) SB (c)     Post SpO2 (%)  93  -SB (r) AA (t) SB (c)     O2 Delivery Post Treatment  room air  -SB (r) AA (t) SB (c)     Pre Patient Position  Supine  -SB (r) AA (t) SB (c)     Intra Patient Position  Standing  -SB (r) AA (t) SB (c)     Post Patient Position  Sitting  -SB (r) AA (t) SB (c)     Row Name 03/10/21 0750          Positioning and Restraints    Pre-Treatment Position  in bed  -SB (r) AA (t) SB (c)     Post Treatment Position  chair  -SB (r) AA (t) SB (c)     In Chair  sitting;call light within reach;encouraged to call for assist  -SB (r) AA (t) SB (c)       User Key  (r) = Recorded  By, (t) = Taken By, (c) = Cosigned By    Initials Name Provider Type    Fariba Angelo, PT DPT Physical Therapist    Jose Luis Martinez, PT Student PT Student        Outcome Measures     Row Name 03/10/21 0750          How much help from another person do you currently need...    Turning from your back to your side while in flat bed without using bedrails?  4  -SB (r) AA (t) SB (c)     Moving from lying on back to sitting on the side of a flat bed without bedrails?  4  -SB (r) AA (t) SB (c)     Moving to and from a bed to a chair (including a wheelchair)?  4  -SB (r) AA (t) SB (c)     Standing up from a chair using your arms (e.g., wheelchair, bedside chair)?  4  -SB (r) AA (t) SB (c)     Climbing 3-5 steps with a railing?  4  -SB (r) AA (t) SB (c)     To walk in hospital room?  4  -SB (r) AA (t) SB (c)     AM-PAC 6 Clicks Score (PT)  24  -SB (r) AA (t)     Row Name 03/10/21 0750          Functional Assessment    Outcome Measure Options  AM-PAC 6 Clicks Basic Mobility (PT)  -SB (r) AA (t) SB (c)       User Key  (r) = Recorded By, (t) = Taken By, (c) = Cosigned By    Initials Name Provider Type    Fariba Angelo, PT DPT Physical Therapist    Jose Luis Martinez, PT Student PT Student        Physical Therapy Education                 Title: PT OT SLP Therapies (Resolved)     Topic: Physical Therapy (Resolved)     Point: Mobility training (Resolved)     Learning Progress Summary           Patient Acceptance, E,D, VU,DU by DREW at 3/10/2021 0933    Comment: Pgt edu on safe ambulation, POC, d/c plan, spinal precautions, brace wear                   Point: Home exercise program (Resolved)     Learning Progress Summary           Patient Acceptance, E,D, VU,DU by DREW at 3/10/2021 0933    Comment: Pgt edu on safe ambulation, POC, d/c plan, spinal precautions, brace wear                   Point: Body mechanics (Resolved)     Learning Progress Summary           Patient Acceptance, E,D, VU,DU by DREW at 3/10/2021 0989     Comment: Pgt edu on safe ambulation, POC, d/c plan, spinal precautions, brace wear                   Point: Precautions (Resolved)     Learning Progress Summary           Patient Acceptance, E,D, JAMES,DU by DREW at 3/10/2021 0933    Comment: Pgt edu on safe ambulation, POC, d/c plan, spinal precautions, brace wear                               User Key     Initials Effective Dates Name Provider Type Discipline     12/23/20 -  Jose Luis Liu, PT Student PT Student PT              PT Recommendation and Plan     Plan of Care Reviewed With: patient  Progress: no change  Outcome Summary: PT evaluation completed. Pt A&Ox4, presents s/p cervical discectomy anterior with fusion, cervical 3/4 03/09/21. Pt in fowlers upon arrival, on room air. Pt was independent in ADLs and mobility prior to hospital admission. Pt was edu on, verbalized compliance, and demonstrated compliance with spinal precautions and brace wear. Pt strength, sensation, and ROM is WNL. Pt performed bed mobility and ambulated with SBA. Pt ambulated 200’ with slight deviation from linear gait that he self corrects. Pt is complaint with spinal precautions and I have no concerns about him returning home. Recommend d/c home with assist. Please re-consult physical therapy if needed.     Time Calculation:   PT Charges     Row Name 03/10/21 0844             Time Calculation    Start Time  0808  -SB (r) AA (t) SB (c)      Stop Time  0826 7 min chart review for a total of 25 minutes  -SB (r) AA (t) SB (c)      Time Calculation (min)  18 min  -SB (r) AA (t)      PT Received On  03/10/21  -SB (r) AA (t) SB (c)      PT Goal Re-Cert Due Date  03/20/21  -SB (r) AA (t) SB (c)        User Key  (r) = Recorded By, (t) = Taken By, (c) = Cosigned By    Initials Name Provider Type    Fariba Angelo, PT DPT Physical Therapist    Jose Luis Martinez, PT Student PT Student            PT G-Codes  Outcome Measure Options: AM-PAC 6 Clicks Daily Activity (OT)  AM-PAC 6 Clicks  Score (PT): 24  AM-PAC 6 Clicks Score (OT): 22    Jose Luis Liu, PT Student  3/10/2021

## 2021-03-10 NOTE — THERAPY DISCHARGE NOTE
Acute Care - Occupational Therapy Discharge  [facility]    Patient Name: Eduard Tam  : 1950    MRN: [id number]                              Today's Date: 3/10/2021       Admit Date: 3/9/2021    Visit Dx:     ICD-10-CM ICD-9-CM   1. Cervical myelopathy (CMS/HCC)  G95.9 721.1   2. Cervical spinal stenosis  M48.02 723.0     Patient Active Problem List   Diagnosis   • Non-recurrent bilateral inguinal hernia without obstruction or gangrene   • Tobacco abuse   • Cervical myelopathy (CMS/HCC)   • Cervical spinal stenosis     Past Medical History:   Diagnosis Date   • Chronic neck pain    • Numbness and tingling of upper extremity     when neck turns certain positions     Past Surgical History:   Procedure Laterality Date   • ANTERIOR CERVICAL DISCECTOMY W/ FUSION N/A 3/9/2021    Procedure: CERVICAL DISCECTOMY ANTERIOR WITH FUSION, Cervical 3/4;  Surgeon: Shaun Solis MD;  Location: [facility];  Service: Neurosurgery;  Laterality: N/A;   • INGUINAL HERNIA REPAIR       General Information     Row Name 03/10/21 0801          OT Time and Intention    Document Type  evaluation  -MW     Mode of Treatment  occupational therapy  -MW     Row Name 03/10/21 0801          General Information    Patient Profile Reviewed  yes  -MW     Prior Level of Function  independent:;all household mobility;community mobility;ADL's  -MW     Existing Precautions/Restrictions  fall;spinal Aspen collar at all times  -MW     Barriers to Rehab  none identified  -MW     Row Name 03/10/21 0801          Occupational Profile    Reason for Services/Referral (Occupational Profile)  s/p ACDF C3-4 3/9  -MW     Row Name 03/10/21 0801          Living Environment    Lives With  spouse  -MW     Row Name 03/10/21 0801          Stairs Within Home, Primary    Number of Stairs, Within Home, Primary  none  -MW     Row Name 03/10/21 0801          Cognition    Orientation Status (Cognition)  oriented x 4  -MW       User Key  (r) = Recorded By, (t) =  Taken By, (c) = Cosigned By    Initials Name Provider Type    MW Breanna Deluna, OTR/L Occupational Therapist        Mobility/ADL's     Row Name 03/10/21 0801          Bed Mobility    Bed Mobility  supine-sit  -     Supine-Sit [place] (Bed Mobility)  independent  -     Comment (Bed Mobility)  educated on log roll technique, sits up without using technique using momentum  -     Row Name 03/10/21 0801          Transfers    Transfers  sit-stand transfer  -     Sit-Stand [place] (Transfers)  supervision  -     Row Name 03/10/21 0801          Functional Mobility    Functional Mobility- Ind. Level  supervision required  -     Functional Mobility- Comment  amb to end of christians[place] and back to room with no difficulty  -     Row Name 03/10/21 0801          Activities of Daily Living    BADL Assessment/Intervention  lower body dressing  -     Row Name 03/10/21 0801          Lower Body Dressing Assessment/Training    [place] Level (Lower Body Dressing)  don;doff;socks;independent  -     Position (Lower Body Dressing)  edge of bed sitting  -     Comment (Lower Body Dressing)  no difficulty with task  -       User Key  (r) = Recorded By, (t) = Taken By, (c) = Cosigned By    Initials Name Provider Type    MW Breanna Deluna, OTR/L Occupational Therapist        Obj/Interventions     Row Name 03/10/21 0801          Sensory Assessment (Somatosensory)    Sensory Assessment (Somatosensory)  UE sensation intact  -     Row Name 03/10/21 0801          Sensory Interventions    Comment, Sensory Intervention  reports no difficulty with sensation BUE  -     Row Name 03/10/21 0801          Range of Motion Comprehensive    Comment, General Range of Motion  AROM WFL BUE  -     Row Name 03/10/21 0801          Strength Comprehensive (MMT)    Comment, General Manual Muscle Testing (MMT) Assessment  B  and pinch strength 5/5  -MW     Row Name 03/10/21 0801          Balance    Balance Assessment  sitting  static balance;sitting dynamic balance;standing static balance;standing dynamic balance  -MW     Static Sitting Balance  WFL  -MW     Dynamic Sitting Balance  WFL  -MW     Static Standing Balance  WFL  -MW     Dynamic Standing Balance  WFL reports feeling mild clumsiness, no LOB or assistance required  -       User Key  (r) = Recorded By, (t) = Taken By, (c) = Cosigned By    Initials Name Provider Type     Breanna Deluna, OTR/L Occupational Therapist        Goals/Plan    No documentation.       Clinical Impression     [facility] Name 03/10/21 0801          Pain Assessment    Additional Documentation  Pain Scale: Numbers Pre/Post-Treatment (Group)  -[facility] Name 03/10/21 0801          Pain Scale: Numbers Pre/Post-Treatment    Pretreatment Pain Rating  4/10  -MW     Posttreatment Pain Rating  4/10  -MW     Pain Location - Orientation  incisional  -     Pain Intervention(s)  Medication (See MAR);Repositioned;Ambulation/increased activity  -[facility] Name 03/10/21 0801          Plan of Care Review    Progress  no change  -     Outcome Summary  OT eval completed. Pt awake and alert in fowlers, [place] in place but loose. S for bed mobility, educated on log roll for home. No difficulty with coordination, strength, or balance with LB dressing task unsupported sitting EOB. Don/doffed brace to ensure understanding of [place] wear/care, pt verb understanding. Amb in christians[place] w S, reports feeling mild clumsiness but no LOB or assist required. Pt demos no deficits BUE with sensation or strength, 5/5. No OT warranted at this time, anticipate d/c home w assist.  -[facility] Name 03/10/21 0801          Therapy Assessment/Plan (OT)    Criteria for Skilled Therapeutic Interventions Met (OT)  no;no problems identified which require skilled intervention  -     Therapy Frequency (OT)  evaluation only  -     Row Name 03/10/21 0801          Therapy Plan Review/Discharge Plan (OT)    Anticipated Discharge Disposition (OT)  home with  assist  -MW     Row Name 03/10/21 0801          Vital Signs    Pre SpO2 (%)  94  -MW     O2 Delivery Pre Treatment  room air  -MW     O2 Delivery Intra Treatment  room air  -MW     Post SpO2 (%)  94  -MW     O2 Delivery Post Treatment  room air educated on pursed lip breathing and use of incentive spirometer  -     Row Name 03/10/21 0801          Positioning and Restraints    Pre-Treatment Position  in bed  -MW     Post Treatment Position  chair  -MW     In Chair  sitting;call light within reach;encouraged to call for assist;with brace  -       User Key  (r) = Recorded By, (t) = Taken By, (c) = Cosigned By    Initials Name Provider Type    Breanna Wiggins, OTR/L Occupational Therapist        Outcome Measures     Row Name 03/10/21 0801          How much help from another is currently needed...    Putting on and taking off regular lower body clothing?  4  -MW     Bathing (including washing, rinsing, and drying)  3  -MW     Toileting (which includes using toilet bed pan or urinal)  4  -MW     Putting on and taking off regular upper body clothing  3  -MW     Taking care of personal grooming (such as brushing teeth)  4  -MW     Eating meals  4  -MW     AM-PAC 6 Clicks Score (OT)  22  -MW     Row Name 03/10/21 0801          Functional Assessment    Outcome Measure Options  AM-PAC 6 Clicks Daily Activity (OT)  -MW       User Key  (r) = Recorded By, (t) = Taken By, (c) = Cosigned By    Initials Name Provider Type    Breanna Wiggins, OTR/L Occupational Therapist        Occupational Therapy Education                 Title: PT OT SLP Therapies (Done)     Topic: Occupational Therapy (Done)     Point: ADL training (Done)     Description:   Instruct learner(s) on proper safety adaptation and remediation techniques during self care or transfers.   Instruct in proper use of assistive devices.              Learning Progress Summary           Patient Acceptance, E,D, VU by ANITA at 3/10/2021 0903                   Point:  Home exercise program (Done)     Description:   Instruct learner(s) on appropriate technique for monitoring, assisting and/or progressing therapeutic exercises/activities.              Learning Progress Summary           Patient Acceptance, E,D, VU by  at 3/10/2021 0903                   Point: Precautions (Done)     Description:   Instruct learner(s) on prescribed precautions during self-care and functional transfers.              Learning Progress Summary           Patient Acceptance, E,D, VU by  at 3/10/2021 0903                   Point: Body mechanics (Done)     Description:   Instruct learner(s) on proper positioning and spine alignment during self-care, functional mobility activities and/or exercises.              Learning Progress Summary           Patient Acceptance, E,D, VU by  at 3/10/2021 0903                               User Key     Initials Effective Dates Name Provider Type Discipline     08/28/18 -  Breanna Deluna, OTR/L Occupational Therapist OT              OT Recommendation and Plan  Retired Outcome Summary/Treatment Plan (OT)  Anticipated Discharge Disposition (OT): home with assist  Therapy Frequency (OT): evaluation only  Plan of Care Review  Progress: no change  Outcome Summary: OT eval completed. Pt awake and alert in fowlers, [place] in place but loose. S for bed mobility, educated on log roll for home. No difficulty with coordination, strength, or balance with LB dressing task unsupported sitting EOB. Don/doffed brace to ensure understanding of [place] wear/care, pt verb understanding. Amb in [place] w S, reports feeling mild clumsiness but no LOB or assist required. Pt demos no deficits BUE with sensation or strength, 5/5. No OT warranted at this time, anticipate d/c home w assist.  Outcome Summary: OT eval completed. Pt awake and alert in fowlers, [place] in place but loose. S for bed mobility, educated on log roll for home. No difficulty with coordination, strength, or balance with LB  dressing task unsupported sitting EOB. Don/doffed brace to ensure understanding of [place] wear/care, pt verb understanding. Amb in [place] w S, reports feeling mild clumsiness but no LOB or assist required. Pt demos no deficits BUE with sensation or strength, 5/5. No OT warranted at this time, anticipate d/c home w assist.     Time Calculation:   Time Calculation- OT     Row Name 03/10/21 0904             Time Calculation- OT    OT Start Time  0750  -MW      OT Stop Time  0830  -MW      OT Time Calculation (min)  40 min  -MW      OT Received On  03/10/21  -MW        User Key  (r) = Recorded By, (t) = Taken By, (c) = Cosigned By    Initials Name Provider Type     Breanna Deluna OTR/L Occupational Therapist        Therapy Charges for Today     Code Description Service Date Service Provider Modifiers Qty    [id number]  OT EVAL LOW COMPLEXITY 3 3/10/2021 Breanna Deluna OTR/L GO 1               COMFORT Mejia/JAIRO  3/10/2021

## 2021-03-10 NOTE — PLAN OF CARE
Goal Outcome Evaluation:  Plan of Care Reviewed With: patient  Progress: no change  Outcome Summary: PT evaluation completed. Pt A&Ox4, presents s/p cervical discectomy anterior with fusion, cervical 3/4 03/09/21. Pt in fowlers upon arrival, on room air. Pt was independent in ADLs and mobility prior to hospital admission. Pt was edu on, verbalized compliance, and demonstrated compliance with spinal precautions and brace wear. Pt strength, sensation, and ROM is WNL. Pt performed bed mobility and ambulated with SBA. Pt ambulated 200’ with slight deviation from linear gait that he self corrects. Pt is complaint with spinal precautions and I have no concerns about him returning home. Recommend d/c home with assist. Please re-consult physical therapy if needed.

## 2021-03-10 NOTE — PLAN OF CARE
Goal Outcome Evaluation:     Progress: no change  Outcome Summary: OT eval completed. Pt awake and alert in fowlers, [place] in place but loose. S for bed mobility, educated on log roll for home. No difficulty with coordination, strength, or balance with LB dressing task unsupported sitting EOB. Don/doffed brace to ensure understanding of [place] wear/care, pt verb understanding. Amb in [place] w S, reports feeling mild clumsiness but no LOB or assist required. Pt demos no deficits BUE with sensation or strength, 5/5. No OT warranted at this time, anticipate d/c home w assist.   Left message to reschedule 7/31 appointment with Nafisa Huffman.

## 2021-03-10 NOTE — PLAN OF CARE
Goal Outcome Evaluation:  Plan of Care Reviewed With: patient  Progress: improving  Outcome Summary: A&Ox4. Medicated for c/o pain with relief noted. Incision CDI. Collar in place. C/O slight numbness to right hand. Voiding. Resting well.

## 2021-03-10 NOTE — PROGRESS NOTES
NEUROSURGERY DAILY PROGRESS NOTE    ASSESSMENT:   Eduard Tam is a 70 y.o. with a significant medical history of tobacco abuse with a 50-pack-year history.  He presents today with a new problem of minimal neck pain, primarily numbness and tingling to the bilateral upper extremities worsening with upward gaze, 10% neck, 90% arms.  ROSMERY: 18.  mJOA: 15/18.  Physical exam findings of decreased bilateral  strengths since last visit, left abducens nerve palsy, left bicept 4/5, gross hyperreflexia without Fanny's, 2-3 beats of clonus noted on the right, and a normal gait. No radiology results for the last 30 days.     Past Medical History:   Diagnosis Date   • Chronic neck pain    • Numbness and tingling of upper extremity     when neck turns certain positions     Active Hospital Problems    Diagnosis    • Cervical spinal stenosis    • Cervical myelopathy (CMS/HCC)      PLAN:   Neuro: Stable.  Neuro intact/at baseline   POD # 1 (3/9/2021) ACDF C3-4   Cervical collar in place, trachea midline   Neurochecks every 4 hours   Postop x-rays stable    CV: No acute issues.   Pulm: Maintaining O2 sat.  Continuous pulse oximetry.  Incentive spirometry.   : Voiding spontaneously.  Bladder scans and I/O cath per policy.   FEN: Regular diet.  Advance as tolerated.   GI: No acute issues.    ID: 23-hour postoperative prophylactic antibiotics.    Heme:  DVT prophylaxis; SCD's  Pain: Tolerable at present with oral meds.     Dispo: PT/OT.     DC home today   Follow-up and additional recommendations per home instructions    CHIEF COMPLAINT:   Bilateral upper extremity numbness and tingling    Subjective  Symptom stable.  Doing well    Temp:  [97.4 °F (36.3 °C)-98.3 °F (36.8 °C)] 97.7 °F (36.5 °C)  Heart Rate:  [50-80] 55  Resp:  [16-20] 16  BP: (106-159)/(61-87) 115/65    Objective:  General Appearance:  Well-appearing and in no acute distress.    Vital signs: (most recent): Blood pressure 115/65, pulse 55, temperature 97.7 °F  "(36.5 °C), temperature source Oral, resp. rate 16, height 170 cm (66.93\"), weight 67.8 kg (149 lb 7.6 oz), SpO2 92 %.        Neurologic Exam     Mental Status   Oriented to person, place, and time.   Attention: normal. Concentration: normal.   Speech: speech is normal   Level of consciousness: alert    Alert and oriented x3.  Follows commands without prompting showing thumbs up into fingers bilaterally     Cranial Nerves     CN II   Visual fields full to confrontation.     CN III, IV, VI   Pupils are equal, round, and reactive to light.  Extraocular motions are normal.     CN V   Facial sensation intact.     CN VII   Facial expression full, symmetric.     CN VIII   CN VIII normal.     CN IX, X   CN IX normal.     CN XI   CN XI normal.     Motor Exam   Right arm tone: normal  Left arm tone: normal  Right leg tone: normal  Left leg tone: normal    Strength   Right deltoid: 5/5  Left deltoid: 5/5  Right biceps: 5/5  Left biceps: 5/5  Right triceps: 5/5  Left triceps: 5/5  Right wrist extension: 5/5  Left wrist extension: 5/5  Right iliopsoas: 5/5  Left iliopsoas: 5/5  Right quadriceps: 5/5  Left quadriceps: 5/5  Right anterior tibial: 5/5  Left anterior tibial: 5/5  Right gastroc: 5/5  Left gastroc: 5/5  Right EHL 5/5  Left EHL 5/5       Sensory Exam   Right arm light touch: normal  Left arm light touch: normal  Right leg light touch: normal  Left leg light touch: normal    Gait, Coordination, and Reflexes     Tremor   Resting tremor: absent  Intention tremor: absent  Action tremor: absent    Incision: C, D, I    Drains: * No LDAs found *    Imaging Results (Last 24 Hours)     Procedure Component Value Units Date/Time    XR Spine Cervical 2 View [[id number]] Collected: 03/09/21 1501     Updated: 03/10/21 0654    Narrative:      XR SPINE CERVICAL 2 VW- 3/9/2021 12:30 PM CST     HISTORY: acf; G95.9-Disease of spinal cord, unspecified; M48.02-Spinal  stenosis, cervical region      COMPARISON: None       Impression:      4 " intraoperative fluoroscopic views are submitted. These images were  obtained for procedural guidance during cervical fusion Please refer to  the operative note for more details. Fluoroscopy time was 23 seconds  with a dose of 1.2140 mGy.   This report was finalized on 03/09/2021 15:02 by Dr. Valentin Bassett MD.    FL C Arm During Surgery [[id number]] Collected: 03/09/21 1501     Updated: 03/10/21 0654    Narrative:      XR SPINE CERVICAL 2 VW- 3/9/2021 12:30 PM CST     HISTORY: acf; G95.9-Disease of spinal cord, unspecified; M48.02-Spinal  stenosis, cervical region      COMPARISON: None       Impression:      4 intraoperative fluoroscopic views are submitted. These images were  obtained for procedural guidance during cervical fusion Please refer to  the operative note for more details. Fluoroscopy time was 23 seconds  with a dose of 1.2140 mGy.   This report was finalized on 03/09/2021 15:02 by Dr. Valentin Bassett MD.    XR Spine Cervical 2 View [[id number]] Collected: 03/09/21 2045     Updated: 03/09/21 2055    Narrative:      EXAMINATION: Cervical spine 2 views 3/9/2021     HISTORY: Postop evaluation     FINDINGS: Two-view exam of the cervical spine again demonstrates  reversal the normal cervical lordosis with mild anterolisthesis of C4 on  C5. The patient's undergone fusion at C3-C4 with an anterior fusion  plate and interbody graft. No evidence of complications.       Impression:      1.. Status post ACDF at C3-C4 without evidence of complications.  2. Reversal of the normal cervical lordosis with advanced degenerative  disc disease at C6-C7 and C7-T1. There is anterolisthesis of C4 on C5  stable from the previous study.  This report was finalized on 03/09/2021 20:46 by Dr. Jordi Pro MD.        Lab Results (last 24 hours)     Procedure Component Value Units Date/Time    Comprehensive Metabolic Panel [[id number]]  (Abnormal) Collected: 03/10/21 0628    Specimen: Blood Updated: 03/10/21 0716     Glucose 142  mg/dL      BUN 12 mg/dL      Creatinine 1.02 mg/dL      Sodium 135 mmol/L      Potassium 4.3 mmol/L      Chloride 104 mmol/L      CO2 25.0 mmol/L      Calcium 11.8 mg/dL      Total Protein 6.0 g/dL      Albumin 3.40 g/dL      ALT (SGPT) 11 U/L      AST (SGOT) 22 U/L      Alkaline Phosphatase 77 U/L      Total Bilirubin 0.4 mg/dL      eGFR Non African Amer 73 mL/min/1.73      Globulin 2.6 gm/dL      A/G Ratio 1.3 g/dL      BUN/Creatinine Ratio 11.8     Anion Gap 6.0 mmol/L     Narrative:      GFR Normal >60  Chronic Kidney Disease <60  Kidney Failure <15      CBC Auto Differential [[id number]]  (Abnormal) Collected: 03/10/21 0626    Specimen: Blood Updated: 03/10/21 0650     WBC 9.56 10*3/mm3      RBC 4.37 10*6/mm3      Hemoglobin 13.5 g/dL      Hematocrit 40.7 %      MCV 93.1 fL      MCH 30.9 pg      MCHC 33.2 g/dL      RDW 14.2 %      RDW-SD 49.0 fl      MPV 9.7 fL      Platelets 179 10*3/mm3      Neutrophil % 74.2 %      Lymphocyte % 14.3 %      Monocyte % 10.9 %      Eosinophil % 0.1 %      Basophil % 0.2 %      Immature Grans % 0.3 %      Neutrophils, Absolute 7.09 10*3/mm3      Lymphocytes, Absolute 1.37 10*3/mm3      Monocytes, Absolute 1.04 10*3/mm3      Eosinophils, Absolute 0.01 10*3/mm3      Basophils, Absolute 0.02 10*3/mm3      Immature Grans, Absolute 0.03 10*3/mm3      nRBC 0.0 /100 WBC           [id number]  Haroldo Cintron, APRN

## 2021-03-10 NOTE — NURSING NOTE
Patient is discharged at this time. All of patients discharge instructions were gone over and all questions from patient and wife were answered at the time of discharge. Patients IV's were removed. Patient verbalized understanding of appointment times and the importance of keeping those appointments. Patient was discharged via wheelchair and was discharged to the care of his wife. Patient verbalized understanding that the collar should be kept on unless the patient is bathing.

## 2021-03-10 NOTE — PLAN OF CARE
Problem: Adult Inpatient Plan of Care  Goal: Plan of Care Review  Outcome: Met  Goal: Patient-Specific Goal (Individualized)  Outcome: Met  Goal: Absence of Hospital-Acquired Illness or Injury  Outcome: Met  Intervention: Identify and Manage Fall Risk  Recent Flowsheet Documentation  Taken 3/10/2021 0734 by Rochelle Henson RN  Safety Promotion/Fall Prevention: safety round/check completed  Intervention: Prevent Skin Injury  Recent Flowsheet Documentation  Taken 3/10/2021 0734 by Rochelle Henson RN  Body Position: supine, legs elevated  Intervention: Prevent and Manage VTE (venous thromboembolism) Risk  Recent Flowsheet Documentation  Taken 3/10/2021 0734 by Rochelle Henson RN  VTE Prevention/Management:   bilateral   sequential compression devices on  Intervention: Prevent Infection  Recent Flowsheet Documentation  Taken 3/10/2021 0734 by Rochelle Henson RN  Infection Prevention:   rest/sleep promoted   single patient room provided  Goal: Optimal Comfort and Wellbeing  Outcome: Met  Intervention: Provide Person-Centered Care  Recent Flowsheet Documentation  Taken 3/10/2021 0734 by Rochelle Henson RN  Trust Relationship/Rapport:   care explained   choices provided   emotional support provided   empathic listening provided   questions answered   reassurance provided   questions encouraged   thoughts/feelings acknowledged  Goal: Readiness for Transition of Care  Outcome: Met     Problem: Bleeding (Spinal Surgery)  Goal: Absence of Bleeding  Outcome: Met     Problem: Bowel Elimination Impaired (Spinal Surgery)  Goal: Effective Bowel Elimination  Outcome: Met     Problem: Functional Ability Impaired (Spinal Surgery)  Goal: Optimal Functional Ability  Outcome: Met  Intervention: Optimize Functional Status  Recent Flowsheet Documentation  Taken 3/10/2021 0734 by Rochelle Henson RN  Activity Management: up in chair  Positioning/Transfer Devices:   in use   pillows     Problem: Infection (Spinal Surgery)  Goal: Absence of  Infection Signs and Symptoms  Outcome: Met     Problem: Neurologic Impairment (Spinal Surgery)  Goal: Optimal Neurologic Function  Outcome: Met  Intervention: Optimize Neurologic Function  Recent Flowsheet Documentation  Taken 3/10/2021 0734 by Rochelle Henson RN  Body Position: supine, legs elevated     Problem: Ongoing Anesthesia Effects (Spinal Surgery)  Goal: Anesthesia/Sedation Recovery  Outcome: Met  Intervention: Optimize Anesthesia Recovery  Recent Flowsheet Documentation  Taken 3/10/2021 0734 by Rochelle Henson RN  $ Incentive Spirometry: yes  Safety Promotion/Fall Prevention: safety round/check completed  Administration (IS): instruction provided, follow-up  Level Incentive Spirometer (mL): 2500  Number of Repetitions (IS): 10     Problem: Pain (Spinal Surgery)  Goal: Acceptable Pain Control  Outcome: Met     Problem: Postoperative Nausea and Vomiting (Spinal Surgery)  Goal: Nausea and Vomiting Relief  Outcome: Met     Problem: Postoperative Urinary Retention (Spinal Surgery)  Goal: Effective Urinary Elimination  Outcome: Met     Problem: Fall Injury Risk  Goal: Absence of Fall and Fall-Related Injury  Outcome: Met  Intervention: Identify and Manage Contributors to Fall Injury Risk  Recent Flowsheet Documentation  Taken 3/10/2021 0734 by Rochelle Henson RN  Medication Review/Management: medications reviewed  Intervention: Promote Injury-Free Environment  Recent Flowsheet Documentation  Taken 3/10/2021 0734 by Rochelle Henson RN  Safety Promotion/Fall Prevention: safety round/check completed  Goal: Absence of Fall and Fall-Related Injury  Outcome: Met  Intervention: Identify and Manage Contributors to Fall Injury Risk  Recent Flowsheet Documentation  Taken 3/10/2021 0734 by Rochelle Henson RN  Medication Review/Management: medications reviewed  Intervention: Promote Injury-Free Environment  Recent Flowsheet Documentation  Taken 3/10/2021 0734 by Rochelle Henson RN  Safety Promotion/Fall Prevention: safety  round/check completed     Problem: Pain Chronic (Persistent) (Comorbidity Management)  Goal: Acceptable Pain Control and Functional Ability  Outcome: Met  Intervention: Manage Persistent Pain  Recent Flowsheet Documentation  Taken 3/10/2021 9017 by Rochelle Henson RN  Medication Review/Management: medications reviewed   Goal Outcome Evaluation:  Patient is okay for discharge at this time. Patients care plan goals have been met adequately enough for discharge to be safe. Patient is to follow up outpatient

## 2021-03-25 ENCOUNTER — OFFICE VISIT (OUTPATIENT)
Dept: NEUROSURGERY | Facility: CLINIC | Age: 71
End: 2021-03-25

## 2021-03-25 VITALS — WEIGHT: 149 LBS | HEIGHT: 67 IN | BODY MASS INDEX: 23.39 KG/M2

## 2021-03-25 DIAGNOSIS — R20.0 NUMBNESS AND TINGLING OF BOTH UPPER EXTREMITIES: ICD-10-CM

## 2021-03-25 DIAGNOSIS — Z72.0 TOBACCO ABUSE: ICD-10-CM

## 2021-03-25 DIAGNOSIS — M48.02 CERVICAL SPINAL STENOSIS: ICD-10-CM

## 2021-03-25 DIAGNOSIS — R20.2 NUMBNESS AND TINGLING OF BOTH UPPER EXTREMITIES: ICD-10-CM

## 2021-03-25 DIAGNOSIS — Z98.890 STATUS POST CERVICAL DISCECTOMY: ICD-10-CM

## 2021-03-25 DIAGNOSIS — G95.9 CERVICAL MYELOPATHY (HCC): Primary | ICD-10-CM

## 2021-03-25 PROCEDURE — 99024 POSTOP FOLLOW-UP VISIT: CPT | Performed by: NURSE PRACTITIONER

## 2021-03-25 NOTE — PROGRESS NOTES
Chief complaint:   Chief Complaint   Patient presents with   • Post-op     Pt is here post op from cervical discectomy 3/9/21.        Subjective     HPI:   Interval History: Eduard Tam is a 70 y.o.  male who presents today for post operative follow-up from a CERVICAL DISCECTOMY ANTERIOR WITH FUSION, Cervical 3/4 on 3/9/2021 per Dr. Solis.  Mr. Tam has done well since we last saw him.  Compliant with cervical collar to date.  His neck discomfort is tolerable at present.  He currently denies upper extremity radicular pain or weakness.  He does however report intermittent difficulty with swallowing that continues to improve from his initial postoperative state, as well as unchanged numbness and tingling to the bilateral hands right greater than left.  He denies fevers, chills, a concern for a postoperative incision infection, gait or balance abnormalities or falls.  He currently rates the severity of his symptoms 2/10.  No additional concerns at this time.    PFSH:   Past Medical History:   Diagnosis Date   • Chronic neck pain    • Numbness and tingling of upper extremity     when neck turns certain positions     Past Surgical History:   Procedure Laterality Date   • ANTERIOR CERVICAL DISCECTOMY W/ FUSION N/A 3/9/2021    Procedure: CERVICAL DISCECTOMY ANTERIOR WITH FUSION, Cervical 3/4;  Surgeon: Shaun Solis MD;  Location: [facility] OR;  Service: Neurosurgery;  Laterality: N/A;   • INGUINAL HERNIA REPAIR       Objective      Current Outpatient Medications   Medication Sig Dispense Refill   • buPROPion (WELLBUTRIN) 75 MG tablet Take 1 tablet by mouth 2 (Two) Times a Day for 30 days. 60 tablet 3   • HYDROcodone-acetaminophen (NORCO) 7.5-325 MG per tablet 1 tab PO Q 6 hr PRN x 1 wk,1 tab PO q 8 hr PRN x 1 wk,1 tab PO q 12 hr PRN x 1 wk  Indications: Pain, postop 63 tablet 0   • multivitamin (THERAGRAN) tablet tablet Take 1 tablet by mouth Daily.     • sennosides-docusate (PERICOLACE) 8.6-50  "MG per tablet Take 2 tablets by mouth 2 (Two) Times a Day for 30 days. 120 tablet 0   • varenicline (Chantix Continuing Month Blake) 1 MG tablet Take 1 tablet by mouth 2 (Two) Times a Day for 56 days. 56 tablet 1     No current facility-administered medications for this visit.     Vital Signs  Ht 170.2 cm (67\") Comment: pt reports  Wt 67.6 kg (149 lb)   BMI 23.34 kg/m²   Physical Exam  Vitals and nursing note reviewed.   Constitutional:       General: He is not in acute distress.     Appearance: Normal appearance. He is well-developed, well-groomed and normal weight. He is not ill-appearing, toxic-appearing or diaphoretic.   HENT:      Head: Normocephalic and atraumatic.      Right Ear: Hearing normal.      Left Ear: Hearing normal.   Eyes:      Extraocular Movements: EOM normal.      Conjunctiva/sclera: Conjunctivae normal.      Pupils: Pupils are equal, round, and reactive to light.   Neck:      Trachea: Trachea normal.     Cardiovascular:      Rate and Rhythm: Normal rate and regular rhythm.   Pulmonary:      Effort: Pulmonary effort is normal. No tachypnea, bradypnea, accessory muscle usage or respiratory distress.   Abdominal:      Palpations: Abdomen is soft.   Musculoskeletal:      Cervical back: No edema, erythema or rigidity.   Skin:     General: Skin is warm and dry.   Neurological:      Mental Status: He is alert and oriented to person, place, and time.      GCS: GCS eye subscore is 4. GCS verbal subscore is 5. GCS motor subscore is 6.      Gait: Gait is intact.      Deep Tendon Reflexes:      Reflex Scores:       Tricep reflexes are 3+ on the right side and 3+ on the left side.       Bicep reflexes are 3+ on the right side and 3+ on the left side.       Brachioradialis reflexes are 3+ on the right side and 3+ on the left side.       Patellar reflexes are 3+ on the right side and 3+ on the left side.       Achilles reflexes are 3+ on the right side and 3+ on the left side.  Psychiatric:         Speech: " Speech normal.         Behavior: Behavior normal. Behavior is cooperative.       Neurologic Exam     Mental Status   Oriented to person, place, and time.   Attention: normal. Concentration: normal.   Speech: speech is normal   Level of consciousness: alert    Cranial Nerves     CN II   Visual fields full to confrontation.     CN III, IV, VI   Pupils are equal, round, and reactive to light.  Extraocular motions are normal.     CN V   Facial sensation intact.     CN VII   Facial expression full, symmetric.     CN VIII   CN VIII normal.     CN IX, X   CN IX normal.     CN XI   CN XI normal.     Motor Exam   Right arm tone: normal  Left arm tone: normal  Right leg tone: normal  Left leg tone: normal    Strength   Right deltoid: 5/5  Left deltoid: 5/5  Right biceps: 5/5  Left biceps: 5/5  Right triceps: 5/5  Left triceps: 5/5  Right wrist extension: 5/5  Left wrist extension: 5/5  Right iliopsoas: 5/5  Left iliopsoas: 5/5  Right quadriceps: 5/5  Left quadriceps: 5/5  Right anterior tibial: 5/5  Left anterior tibial: 5/5  Right gastroc: 5/5  Left gastroc: 5/5  Right EHL 5/5  Left EHL 5/5       Sensory Exam   Right arm light touch: decreased from elbow  Left arm light touch: decreased from elbow  Right leg light touch: normal  Left leg light touch: normal    Gait, Coordination, and Reflexes     Gait  Gait: normal    Tremor   Resting tremor: absent  Intention tremor: absent  Action tremor: absent    Reflexes   Right brachioradialis: 3+  Left brachioradialis: 3+  Right biceps: 3+  Left biceps: 3+  Right triceps: 3+  Left triceps: 3+  Right patellar: 3+  Left patellar: 3+  Right achilles: 3+  Left achilles: 3+  Right Rothman: absent  Left Rothman: absent  Right ankle clonus: absent  Left ankle clonus: absent  Right pendular knee jerk: absent  Left pendular knee jerk: absent    Male  strength (pounds)  AGE Right Hand RH Norms Left Hand LH Norms   20-24  121+20.6  104+21.8   25-29  120+23.0  110+16.2   30-34  121+22.4   110+21.7   35-39  119+24  113+21.7   40-44  117+20.7  112+18.7   45-49  110+23.0  101+22.8   50-54  113+18.1  102+17   55-59  101+26.7  83+23.4   60-64  90+20.4  77+20.3   65-69 95,87>82 91+20.6 *100,94>81 76.8+19.8   70-74  75+21.5  65+18.1   75+  66+21.0  55+17.0   (VICK Stewart et al; Hand Dynometer: Effects of trials and sessions.  Perpetual and Motor Skills 61:195-8, 1985)  * = Dominant hand  > = Intervention    Incision: Scan on 3/25/2021 1524 by Haroldo Cintron, APRN: Postop ACDF  (Consent to obtain photo of postoperative site for documentation purposes only obtained verbally by Mr. Tam)    Results Review: No new imaging.       Assessment/Plan:    Cervical stenosis with myelopathy status post C3-4 ACDF  Eduard Tam is a 70 y.o. male who presents today for post operative wound check following a CERVICAL DISCECTOMY ANTERIOR WITH FUSION, Cervical 3/4 on 3/9/2021 per Dr. Solis.  Mr. Tam has done well since we last saw her.    He has been compliant with her cervical collar to date and there symptoms are stable.   His  strength has decreased slightly postoperatively, however I anticipate this will return to baseline and/or improve over the next 4-6 weeks.  His post operative incision is clean, dry, intact, well approximated, without signs of soft tissue infection.  We discussed the signs and symptoms of a soft tissue infection and I recommended he call for any concerns.  He may continue current pain medication regimen with tapering dosage instructions as previously provided.  B/R/AE discussed.  I advised the patient to keep scheduled appointment with Dr. Solis for reassessment on 5/10/2021.  Obtain x-rays of the cervical spine prior to appointment.  Eduard known to call the neurosurgical clinic to return sooner for any new or additional concerns.      Tobacco abuse  The patient understands the many dangers of continuing to use tobacco. Despite this, Mr. Tam states quitting is not an immediate  priority at this time and declines to discuss tobacco cessation.  I reminded the patient that if quitting becomes an increased priority to contact us for help with quitting.       Diagnoses and all orders for this visit:    1. Cervical myelopathy (CMS/HCC) (Primary)    2. Cervical spinal stenosis    3. Numbness and tingling of both upper extremities    4. Status post cervical discectomy  Comments:  C3/4    5. Tobacco abuse      Return for KEEP SCHEDULED APPT WITH DR. BOWEN.    I discussed the patients findings and my recommendations with patient    Haroldo Cintron, APRN

## 2021-03-25 NOTE — PATIENT INSTRUCTIONS
Advance Care Planning and Advance Directives     You make decisions on a daily basis - decisions about where you want to live, your career, your home, your life. Perhaps one of the most important decisions you face is your choice for future medical care. Take time to talk with your family and your healthcare team and start planning today.  Advance Care Planning is a process that can help you:  · Understand possible future healthcare decisions in light of your own experiences  · Reflect on those decision in light of your goals and values  · Discuss your decisions with those closest to you and the healthcare professionals that care for you  · Make a plan by creating a document that reflects your wishes    Surrogate Decision Maker  In the event of a medical emergency, which has left you unable to communicate or to make your own decisions, you would need someone to make decisions for you.  It is important to discuss your preferences for medical treatment with this person while you are in good health.     Qualities of a surrogate decision maker:  • Willing to take on this role and responsibility  • Knows what you want for future medical care  • Willing to follow your wishes even if they don't agree with them  • Able to make difficult medical decisions under stressful circumstances    Advance Directives  These are legal documents you can create that will guide your healthcare team and decision maker(s) when needed. These documents can be stored in the electronic medical record.    · Living Will - a legal document to guide your care if you have a terminal condition or a serious illness and are unable to communicate. States vary by statute in document names/types, but most forms may include one or more of the following:        -  Directions regarding life-prolonging treatments        -  Directions regarding artificially provided nutrition/hydration        -  Choosing a healthcare decision maker        -  Direction  regarding organ/tissue donation    · Durable Power of  for Healthcare - this document names an -in-fact to make medical decisions for you, but it may also allow this person to make personal and financial decisions for you. Please seek the advice of an  if you need this type of document.    **Advance Directives are not required and no one may discriminate against you if you do not sign one.    Medical Orders  Many states allow specific forms/orders signed by your physician to record your wishes for medical treatment in your current state of health. This form, signed in personal communication with your physician, addresses resuscitation and other medical interventions that you may or may not want.      For more information or to schedule a time with a [facility] Advance Care Planning Facilitator contact: [email or web address] or call [phone] and someone will contact you directly.

## 2021-03-31 ENCOUNTER — TELEPHONE (OUTPATIENT)
Dept: NEUROSURGERY | Facility: CLINIC | Age: 71
End: 2021-03-31

## 2021-04-14 ENCOUNTER — DOCUMENTATION (OUTPATIENT)
Dept: NEUROSURGERY | Facility: CLINIC | Age: 71
End: 2021-04-14

## 2021-04-14 NOTE — PROGRESS NOTES
Received prior authorization request for Rx Chantix through cover my meds. This has been processed online. Pending approval.

## 2021-05-10 ENCOUNTER — OFFICE VISIT (OUTPATIENT)
Dept: NEUROSURGERY | Facility: CLINIC | Age: 71
End: 2021-05-10

## 2021-05-10 ENCOUNTER — HOSPITAL ENCOUNTER (OUTPATIENT)
Dept: GENERAL RADIOLOGY | Facility: HOSPITAL | Age: 71
Discharge: HOME | End: 2021-05-10
Admitting: NURSE PRACTITIONER

## 2021-05-10 VITALS — HEIGHT: 67 IN | WEIGHT: 149 LBS | BODY MASS INDEX: 23.39 KG/M2

## 2021-05-10 DIAGNOSIS — M48.02 CERVICAL SPINAL STENOSIS: ICD-10-CM

## 2021-05-10 DIAGNOSIS — G95.9 CERVICAL MYELOPATHY (HCC): ICD-10-CM

## 2021-05-10 DIAGNOSIS — M48.02 CERVICAL SPINAL STENOSIS: Primary | ICD-10-CM

## 2021-05-10 DIAGNOSIS — F17.210 CIGARETTE SMOKER: ICD-10-CM

## 2021-05-10 DIAGNOSIS — R13.11 ORAL PHASE DYSPHAGIA: ICD-10-CM

## 2021-05-10 PROCEDURE — 72040 X-RAY EXAM NECK SPINE 2-3 VW: CPT

## 2021-05-10 PROCEDURE — 99024 POSTOP FOLLOW-UP VISIT: CPT | Performed by: NEUROLOGICAL SURGERY

## 2021-05-10 NOTE — PROGRESS NOTES
Chief complaint:   Chief Complaint   Patient presents with   • Post-op     Patient underwent ACDF C3-4 on 03/09/21. Patient has multiple complaints regarding worsening N/T of B hands, difficulty swallowing and still having a very hard time with walking/balance. He feels that he is much worse than before surgery.        Subjective     HPI:   Interval History: Eduard returns today postop from a C3/4 anterior cervical discectomy and fusion on 3/9/2021.  He has had some swallowing trouble since surgery but he has not had any incisional complications or drainage from his wound.  His pain today is a 0 out of 10.  His right arm radicular pain resolved post surgery.  But his bilateral numbness and tingling is worse than before surgery and this is now constant.  His fine motor function is gait are unchanged she still feels that he has poor balance.  He has been compliant with his cervical collar.    Still complains of some intermittent sharp shooting cervical pain when either he has a pothole while driving or while riding his lawnmower.  Because of this he wears a cervical collar when riding his lawnmower.    Patient states that numbness and tingling in his hand is is now constant where it only occurred when hyperextending his neck prior to surgery.    Oswestry Disability Index, Cervical = 10%  SCORE INTERPRETATION OF THE OSWESTRY NECK DISABILITY QUESTIONNAIRE  10-28: Mild disability    Score   Pain Intensity No pain - 0   Personal Care Look after myself normally without causing extra pain-0   Lifting I can only lift very light weights-4   Reading Read without pain-0   Headaches No headaches-0   Concentration Concentrate Fully-0   Work I can only do my usual work only-1   Driving I can drive-0   Sleeping No trouble sleeping-0   Recreation All recreational activities-0   (Dane et al, 1980)      Modified [employer] (mJOA) score  CATEGORY SCORE   Upper extremity Motor Subscore Mild difficulty buttoning  "shirt-4   Lower Extremity Subscore Mild imbalance when standing or walking-6   Upper Extremity Sensory Score Mild loss of hand sensation-2   Urinary Function Subscore Normal urination-3   TOTAL = 15/18    The mJOA is an 18 point score of functional disability specific to cervical myelopathy.   15-17: Mild Myelopathy  Jose J et al. (1991). [employer].       PFSH:  Past Medical History:   Diagnosis Date   • Chronic neck pain    • Numbness and tingling of upper extremity     when neck turns certain positions       Past Surgical History:   Procedure Laterality Date   • ANTERIOR CERVICAL DISCECTOMY W/ FUSION N/A 3/9/2021    Procedure: CERVICAL DISCECTOMY ANTERIOR WITH FUSION, Cervical 3/4;  Surgeon: Shaun Solis MD;  Location: [facility] OR;  Service: Neurosurgery;  Laterality: N/A;   • INGUINAL HERNIA REPAIR         Objective      Current Outpatient Medications   Medication Sig Dispense Refill   • Chantix Starting Month Blake 0.5 MG X 11 & 1 MG X 42 tablet Take  by mouth.     • multivitamin (THERAGRAN) tablet tablet Take 1 tablet by mouth Daily.       No current facility-administered medications for this visit.       Vital Signs  Ht 170.2 cm (67\")   Wt 67.6 kg (149 lb)   BMI 23.34 kg/m²   Physical Exam  Neurologic Exam    Male  strength (pounds)  AGE Right Hand RH Norms Left Hand LH Norms   20-24   121+20.6   104+21.8   25-29   120+23.0   110+16.2   30-34   121+22.4   110+21.7   35-39   119+24   113+21.7   40-44   117+20.7   112+18.7   45-49   110+23.0   101+22.8   50-54   113+18.1   102+17   55-59   101+26.7   83+23.4   60-64   90+20.4   77+20.3   65-69 95,87>82,69 91+20.6 *100,94>81,85 76.8+19.8   70-74   75+21.5   65+18.1   75+   66+21.0   55+17.0   (VICK Stewart et al; Hand Dynometer: Effects of trials and sessions.  Perpetual and Motor Skills 61:195-8, 1985)  * = Dominant hand  > = Intervention      Incision: Dry and intact      Results Review:   XR Spine Cervical 2 View    Result Date: 5/10/2021  1. No " acute osseous injury. 2. Prior anterior fusion at C3-C4. No hardware complication. Degenerative anterolisthesis below this at C4-C5, measuring approximately 5 mm.  This report was finalized on 05/10/2021 09:51 by Dr Jayden Jaimes, .           AP and lateral cervical x-rays reviewed today.  No evidence of hardware complication.  He does appear to have some mild settling of his C4 screws.  But otherwise excellent postoperative films.          Assessment/Plan:   ASSESSMENT/ PLAN:  Eduard Tam is a 70 y.o. male with a significant medical history of tobacco abuse with a 50-pack-year history.  He presents today with a new problem of minimal neck pain, primarily numbness and tingling to the bilateral upper extremities worsening with upward gaze, 10% neck, 90% arms.  Preop ROSMERY 18% is now 10%.  And mJOA remained stable at 15/18.  Physical exam findings of decreased bilateral  strengths since last visit, left abducens nerve palsy,  full strength bicept, gross hyperreflexia without Fanny's, 2-3 beats of clonus noted on the left, and a normal gait.  Postoperative x-rays appear stable.     TREATMENT RECOMMENDATIONS ...  Cervical Spondylitic Myelopathy  Status post C3/4 ACDF  Eduard has done fairly well since I saw him last.  He still has some swallowing difficulties and for this I would like to obtain a speech fluoroscopy examination.      Additionally his numbness and tingling in his hands which was intermittent with looking upwards is now constant.  I would like to obtain an MRI without contrast to evaluate for decompression of the cervical spine at C3/4 and to ensure that there is no stenosis above or below.  X-rays today appear stable.    Additionally does have some decreased  strength bilaterally.  Differential diagnosis includes damage to the cord during surgery, although neuro monitoring when stable.  This could also be from decreased tone in his upper and lower extremities.  When people have severe cervical  stenosis they often utilize her increased tone for increased strength but decreased mobility.  His tone appears to be decreased since I saw him last.  Last option would be a concomitant peripheral neuropathy.  EMG nerve conduction study of his bilateral upper extremities.    Haroldo was seen back in clinic barring there being a surgical intervention on these test results that needs to be addressed in which case he will see me.    Tobacco abuse  Patient was counseled on and understood the many dangers of continuing to use tobacco.  We discussed nonpharmacologic options and pharmacologic options to include the use of the nicotine patches and/or gum, as well as Chantix.   Mr. Tam has agreed to trial Chantix.  Rx for Chantix starter pack provided.  B/R/AE and use discussed.  I advised the patient to follow-up with Erica Kim APRN within next month to continue this medication or sooner to reports any concerns for adverse effects.   7 minutes were spent in this counseling.       1. Cervical spinal stenosis    2. Cervical myelopathy (CMS/HCC)    3. Oral phase dysphagia    4. Cigarette smoker        Diagnoses and all orders for this visit:    1. Cervical spinal stenosis (Primary)    2. Cervical myelopathy (CMS/HCC)    3. Oral phase dysphagia    4. Cigarette smoker        I discussed the patients findings and my recommendations with patient    Shaun Solis MD

## 2021-06-03 ENCOUNTER — TRANSCRIBE ORDERS (OUTPATIENT)
Dept: LAB | Facility: HOSPITAL | Age: 71
End: 2021-06-03

## 2021-06-09 ENCOUNTER — TRANSCRIBE ORDERS (OUTPATIENT)
Dept: LAB | Facility: HOSPITAL | Age: 71
End: 2021-06-09

## 2021-06-09 DIAGNOSIS — Z01.818 PREOP TESTING: Primary | ICD-10-CM

## 2021-06-11 ENCOUNTER — LAB (OUTPATIENT)
Dept: LAB | Facility: HOSPITAL | Age: 71
End: 2021-06-11

## 2021-06-11 DIAGNOSIS — Z01.818 PREOP TESTING: ICD-10-CM

## 2021-06-11 LAB — SARS-COV-2 ORF1AB RESP QL NAA+PROBE: NOT DETECTED

## 2021-06-11 PROCEDURE — C9803 HOPD COVID-19 SPEC COLLECT: HCPCS

## 2021-06-11 PROCEDURE — U0004 COV-19 TEST NON-CDC HGH THRU: HCPCS

## 2021-06-14 ENCOUNTER — HOSPITAL ENCOUNTER (OUTPATIENT)
Dept: NEUROLOGY | Facility: HOSPITAL | Age: 71
Discharge: HOME | End: 2021-06-14

## 2021-06-14 ENCOUNTER — HOSPITAL ENCOUNTER (OUTPATIENT)
Dept: MRI IMAGING | Facility: HOSPITAL | Age: 71
Discharge: HOME | End: 2021-06-14

## 2021-06-14 ENCOUNTER — HOSPITAL ENCOUNTER (OUTPATIENT)
Dept: GENERAL RADIOLOGY | Facility: HOSPITAL | Age: 71
Discharge: HOME | End: 2021-06-14

## 2021-06-14 DIAGNOSIS — R13.11 ORAL PHASE DYSPHAGIA: ICD-10-CM

## 2021-06-14 DIAGNOSIS — M48.02 CERVICAL SPINAL STENOSIS: ICD-10-CM

## 2021-06-14 DIAGNOSIS — R13.10 DYSPHAGIA, UNSPECIFIED TYPE: Primary | ICD-10-CM

## 2021-06-14 DIAGNOSIS — G95.9 CERVICAL MYELOPATHY (HCC): ICD-10-CM

## 2021-06-14 PROCEDURE — A9270 NON-COVERED ITEM OR SERVICE: HCPCS | Performed by: NEUROLOGICAL SURGERY

## 2021-06-14 PROCEDURE — 63710000001 BARIUM SULFATE 40 % PASTE: Performed by: NEUROLOGICAL SURGERY

## 2021-06-14 PROCEDURE — 63710000001 BARIUM SULFATE 40 % RECONSTITUTED SUSPENSION: Performed by: NEUROLOGICAL SURGERY

## 2021-06-14 PROCEDURE — 63710000001 BARIUM SULFATE 40 % SUSPENSION: Performed by: NEUROLOGICAL SURGERY

## 2021-06-14 PROCEDURE — 72141 MRI NECK SPINE W/O DYE: CPT

## 2021-06-14 PROCEDURE — 63710000001 BARIUM SULFATE 700 MG TABLET: Performed by: NEUROLOGICAL SURGERY

## 2021-06-14 PROCEDURE — 74230 X-RAY XM SWLNG FUNCJ C+: CPT

## 2021-06-14 PROCEDURE — 92611 MOTION FLUOROSCOPY/SWALLOW: CPT

## 2021-06-14 PROCEDURE — 95886 MUSC TEST DONE W/N TEST COMP: CPT

## 2021-06-14 PROCEDURE — 95911 NRV CNDJ TEST 9-10 STUDIES: CPT

## 2021-06-14 RX ADMIN — BARIUM SULFATE 55 ML: 0.81 POWDER, FOR SUSPENSION ORAL at 09:50

## 2021-06-14 RX ADMIN — BARIUM SULFATE 50 ML: 400 PASTE ORAL at 09:50

## 2021-06-14 RX ADMIN — BARIUM SULFATE 50 ML: 400 SUSPENSION ORAL at 09:50

## 2021-06-14 RX ADMIN — BARIUM SULFATE 700 MG: 700 TABLET ORAL at 09:50

## 2021-06-14 NOTE — THERAPY DISCHARGE NOTE
"Outpatient Speech Language Pathology   Adult Swallow Initial Eval/Discharge  [facility]     Patient Name: Eduard Tam  : 1950  MRN: [id number]  Today's Date: 2021      SPEECH-LANGUAGE PATHOLOGY EVALUTION - VFSS  Subjective: The patient was seen on this date for a VFSS(Videofluoroscopic Swallowing Study).  Patient was alert and cooperative.    Significant history: C3/4 anterior cervical discectomy and fusion 21, mild dysphagia since surgery, pt reports feeling food and drink \"won't go down,\" but that it has been improving. He reports difficulty taking pills at times as well.  Objective: Risks/benefits were reviewed with the patient, and consent was obtained. The study was completed with SLP and Radiologist present. The patient was seen in lateral view(s). Textures given included thin liquid, nectar thick liquid, honey thick liquid, puree consistency and mechanical soft consistency.  Assessment: Consistencies were presented in the following order: honey thick via spoon, nectar thick via straw, thin barium via straw, pudding thick via spoon, mechanical soft, barium tablet with thin water, thin barium via straw. The pt was noted to have mild protrusion of the posterior pharyngeal wall into the pharyngeal space at the C3/4 level, possibly due to curvature of the spine and the hardware itself. Upon review of the study, the pt did appear to have adequate inversion of the epiglottis during swallows, but did have decreased pharyngeal constriction. He also had a mildly prominent upper esophageal sphincter with minimal upper esophageal retention of the thin and nectar and mild retention of all thicker consistencies. No definite laryngeal penetration or aspiration was observed with any consistencies. The pt had minimal to mild amounts of residue in the vallecula post-swallow with most consistencies. With the barium tablet the pt initially swallowed only the thin water with the tablet remaining on the back " "of tongue, however with a second sip of water the pt was able to clear the tablet. Regular solids were not presented as the pt didn't wear his dentures.  SLP Findings: Patient presents with functional swallow.   Recommendations: Diet Textures: thin liquid, regular consistency food. Medications should be taken whole with puree.   Recommended Strategies: Upright for PO, small bites and sips and alternate liquids and solids. Oral care before breakfast, after all meals and PRN.  Dysphagia therapy is not recommended. If pt has worsening symptoms of feeling food or drink being \"stuck\" or if he exhibits regurgitation of bolus material, consider repeating this study to assess for any development of a Zenker's diverticulum as he does have mildly prominent upper esophageal sphincter and minimal to mild upper esophageal retention.   Catalina Reynolds CCC-SLP 6/14/2021 14:39 CDT     Visit Date: 06/14/2021   Patient Active Problem List   Diagnosis   • Non-recurrent bilateral inguinal hernia without obstruction or gangrene   • Cigarette smoker   • Cervical myelopathy (CMS/HCC)   • Cervical spinal stenosis   • Oral phase dysphagia        Past Medical History:   Diagnosis Date   • Chronic neck pain    • Numbness and tingling of upper extremity     when neck turns certain positions        Past Surgical History:   Procedure Laterality Date   • ANTERIOR CERVICAL DISCECTOMY W/ FUSION N/A 3/9/2021    Procedure: CERVICAL DISCECTOMY ANTERIOR WITH FUSION, Cervical 3/4;  Surgeon: Shaun Solis MD;  Location: [facility];  Service: Neurosurgery;  Laterality: N/A;   • INGUINAL HERNIA REPAIR           Visit Dx:     ICD-10-CM ICD-9-CM   1. Dysphagia, unspecified type  R13.10 787.20   2. Cervical spinal stenosis  M48.02 723.0   3. Cervical myelopathy (CMS/HCC)  G95.9 721.1   4. Oral phase dysphagia  R13.11 787.21           SLP Adult Swallow Evaluation     Row Name 06/14/21 0942       Rehab Evaluation    Document Type  evaluation  -MB " "   Subjective Information  no complaints  -MB    Patient Observations  alert;cooperative  -MB    Patient/Family/Caregiver Comments/Observations  No family present  -MB       General Information    Patient Profile Reviewed  yes  -MB    Pertinent History Of Current Problem  C3/4 anterior cervical discectomy and fusion 03/09/21, mild dysphagia since surgery, pt reports feeling food and drink \"won't go down,\" but that it has been improving.  -MB    Current Method of Nutrition  regular textures;thin liquids  -MB    Precautions/Limitations, Vision  WFL with corrective lenses  -MB    Precautions/Limitations, Hearing  WFL;for purposes of eval  -MB    Prior Level of Function-Communication  WFL  -MB    Prior Level of Function-Swallowing  no diet consistency restrictions  -MB    Plans/Goals Discussed with  patient  -MB    Barriers to Rehab  none identified  -MB    Patient's Goals for Discharge  patient did not state  -MB       Pain    Additional Documentation  Pain Scale: FACES Pre/Post-Treatment (Group)  -MB       Pain Scale: FACES Pre/Post-Treatment    Pain: FACES Scale, Pretreatment  0-->no hurt  -MB       Oral Motor Structure and Function    Dentition Assessment  missing teeth;other (see comments) dentures not available  -MB    Secretion Management  WNL/WFL  -MB    Mucosal Quality  moist, healthy  -MB       Oral Musculature and Cranial Nerve Assessment    Oral Motor General Assessment  WFL  -MB       General Eating/Swallowing Observations    Respiratory Support Currently in Use  room air  -MB       MBS/VFSS    Utensils Used  spoon;straw  -MB    Consistencies Trialed  soft textures;thin liquids;nectar/syrup-thick liquids;honey-thick liquids;pudding thick;barium pill  -MB       MBS/VFSS Interpretation    Oral Prep Phase  WFL  -MB    Oral Transit Phase  WFL  -MB    Oral Residue  WFL  -MB    VFSS Summary  See note  -MB       Initiation of Pharyngeal Swallow    Initiation of Pharyngeal Swallow  WFL  -MB    Pharyngeal Phase  " functional pharyngeal phase of swallowing  -MB       Esophageal Phase    Esophageal Phase  esophageal retention  -MB       Clinical Impression    SLP Swallowing Diagnosis  functional oral phase;functional pharyngeal phase  -MB    Functional Impact  no impact on function  -MB    Swallow Criteria for Skilled Therapeutic Interventions Met  no problems identified which require skilled intervention  -MB       Recommendations    Therapy Frequency (Swallow)  evaluation only  -MB    SLP Diet Recommendation  regular textures;thin liquids  -MB    Recommended Precautions and Strategies  upright posture during/after eating;small bites of food and sips of liquid;alternate between small bites of food and sips of liquid  -MB    Oral Care Recommendations  Oral Care BID/PRN  -MB    SLP Rec. for Method of Medication Administration  meds whole;with pudding or applesauce  -MB    Monitor for Signs of Aspiration  yes;cough;gurgly voice;throat clearing;pneumonia;notify SLP if any concerns  -MB    Anticipated Discharge Disposition (SLP)  home  -MB      User Key  (r) = Recorded By, (t) = Taken By, (c) = Cosigned By    Initials Name Provider Type    Catalina Dwod CCC-SLP Speech and Language Pathologist                        OP SLP Education     Row Name 06/14/21 1429       Education    Barriers to Learning  No barriers identified  -MB    Education Provided  Described results of evaluation;Patient expressed understanding of evaluation  -MB    Assessed  Learning readiness;Learning preferences;Learning motivation;Learning needs  -MB    Learning Motivation  Strong  -MB    Learning Method  Explanation  -MB    Teaching Response  Verbalized understanding  -MB      User Key  (r) = Recorded By, (t) = Taken By, (c) = Cosigned By    Initials Name Effective Dates    Catalina Dowd CCC-SLP 08/02/16 -                          Time Calculation:   SLP Start Time: 0942  SLP Stop Time: 1050  SLP Time Calculation (min): 68 min  Untimed  Charges  77276-NI Motion Fluoro Eval Swallow Minutes: 68  Total Minutes  Untimed Charges Total Minutes: 68   Total Minutes: 68    Therapy Charges for Today     Code Description Service Date Service Provider Modifiers Qty    [id number]  ST MOTION FLUORO EVAL SWALLOW 5 6/14/2021 Catalina Reynolds CCC-SLP GN 1                      Catalina Reynolds CCC-HERNAN  6/14/2021

## 2021-06-18 NOTE — PROGRESS NOTES
MRI cervical spine is reviewed.  Still some residual stenosis at C3/4.  At this point the pressure is off of his cord.  X-rays appear stable.  I would like to give him some more time to improve.  If no better during his 7/12/2021 visit then we can consider posterior decompression.

## 2021-07-12 ENCOUNTER — OFFICE VISIT (OUTPATIENT)
Dept: NEUROSURGERY | Facility: CLINIC | Age: 71
End: 2021-07-12

## 2021-07-12 VITALS — HEIGHT: 67 IN | WEIGHT: 132 LBS | BODY MASS INDEX: 20.72 KG/M2

## 2021-07-12 DIAGNOSIS — G56.03 BILATERAL CARPAL TUNNEL SYNDROME: ICD-10-CM

## 2021-07-12 DIAGNOSIS — F17.210 CIGARETTE SMOKER: ICD-10-CM

## 2021-07-12 DIAGNOSIS — M48.02 CERVICAL SPINAL STENOSIS: ICD-10-CM

## 2021-07-12 DIAGNOSIS — G95.9 CERVICAL MYELOPATHY (HCC): Primary | ICD-10-CM

## 2021-07-12 PROCEDURE — 99214 OFFICE O/P EST MOD 30 MIN: CPT | Performed by: NURSE PRACTITIONER

## 2021-07-12 NOTE — PROGRESS NOTES
"Chief complaint:   Chief Complaint   Patient presents with   • Results     PT is here results.   Complaints of bilateral hand numbness and tingling.       Subjective     HPI:   Last encounter: 5/10/2021    Interval History: Eduard Tam is a 70 y.o.  male whom presents today for follow-up from an ACDF C3-4 performed on 3/9/2021.  Mr. Tam has done fairly well since we last saw him.  He currently denies neck or upper extremity radicular pain, however reports bilateral hand numbness, dysesthesias, and weakness that worsens throughout the day and with use of his upper extremities.  He additionally reports a progressive decline in dexterity.  He denies a gait or balance abnormality, need for assist while ambulating, or falls he additionally denies fevers, chills, night sweats, unexplained weight loss, saddle anesthesia, or bowel or bladder dysfunction.  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  He currently rates his severity of the symptoms 0/10.    Oswestry Disability Index = 14%   Score   Pain Intensity No pain - 0   Personal Care Look after myself normally without causing extra pain-0   Lifting Lift heavy weights but gives extra pain-1   Reading Read with slight pain-1   Headaches Slight infrequent headaches-1   Concentration Concentrate fully slight difficulty-1   Work I can do my usual work, but no more-2   Driving I can drive-0   Sleeping No trouble sleeping-0   Recreation All recreational activities with some pain-1     SCORE INTERPRETATION OF THE OSWESTRY NECK DISABILITY QUESTIONNAIRE  10-28: Mild disability   (Dane et al, 1980)    Modified [place] [employer] (mJOA) score  CATEGORY SCORE   Upper extremity Motor Subscore Mild difficulty buttoning shirt-4   Lower Extremity Subscore Mild imbalance when standing or walking-6   Upper Extremity Sensory Score Mild loss of hand " "sensation-2   Urinary Function Subscore Normal urination-3   TOTAL = 15/18    The mJOA is an 18 point score of functional disability specific to cervical myelopathy.   15-18: Mild Myelopathy  Jose J et al. (1991). Hernán et al.     The mJOA is an 18 point score of functional disability specific to cervical myelopathy. Jose J et al. (1991).[2]    ROS  Review of Systems   HENT: Negative.    Eyes: Negative.    Respiratory: Negative.    Cardiovascular: Negative.    Gastrointestinal: Negative.    Endocrine: Negative.    Genitourinary: Negative.    Musculoskeletal: Negative.  Negative for gait problem, neck pain and neck stiffness.   Skin: Negative.    Allergic/Immunologic: Negative.    Neurological: Positive for weakness and numbness.   Hematological: Negative.    Psychiatric/Behavioral: Negative.    All other systems reviewed and are negative.    PFSH:  Past Medical History:   Diagnosis Date   • Chronic neck pain    • Numbness and tingling of upper extremity     when neck turns certain positions     Past Surgical History:   Procedure Laterality Date   • ANTERIOR CERVICAL DISCECTOMY W/ FUSION N/A 3/9/2021    Procedure: CERVICAL DISCECTOMY ANTERIOR WITH FUSION, Cervical 3/4;  Surgeon: Shaun Solis MD;  Location: [facility];  Service: Neurosurgery;  Laterality: N/A;   • INGUINAL HERNIA REPAIR       Objective      Current Outpatient Medications   Medication Sig Dispense Refill   • multivitamin (THERAGRAN) tablet tablet Take 1 tablet by mouth Daily.       No current facility-administered medications for this visit.     Vital Signs  Ht 170.2 cm (67\")   Wt 59.9 kg (132 lb)   BMI 20.67 kg/m²   Physical Exam  Vitals and nursing note reviewed.   Constitutional:       General: He is not in acute distress.     Appearance: Normal appearance. He is well-developed, well-groomed and normal weight. He is not ill-appearing, toxic-appearing or diaphoretic.   HENT:      Head: Normocephalic and atraumatic.      Right Ear: " Hearing normal.      Left Ear: Hearing normal.   Eyes:      Extraocular Movements: EOM normal.      Conjunctiva/sclera: Conjunctivae normal.      Pupils: Pupils are equal, round, and reactive to light.   Neck:      Trachea: Trachea normal.   Cardiovascular:      Rate and Rhythm: Normal rate and regular rhythm.   Pulmonary:      Effort: Pulmonary effort is normal. No tachypnea, bradypnea, accessory muscle usage or respiratory distress.   Abdominal:      Palpations: Abdomen is soft.   Musculoskeletal:      Cervical back: Full passive range of motion without pain and neck supple.   Skin:     General: Skin is warm and dry.   Neurological:      Mental Status: He is alert and oriented to person, place, and time.      GCS: GCS eye subscore is 4. GCS verbal subscore is 5. GCS motor subscore is 6.      Gait: Gait is intact.      Deep Tendon Reflexes:      Reflex Scores:       Tricep reflexes are 4+ on the right side and 4+ on the left side.       Bicep reflexes are 4+ on the right side and 4+ on the left side.       Brachioradialis reflexes are 4+ on the right side and 4+ on the left side.       Patellar reflexes are 3+ on the right side and 3+ on the left side.       Achilles reflexes are 2+ on the right side and 2+ on the left side.  Psychiatric:         Speech: Speech normal.         Behavior: Behavior normal. Behavior is cooperative.       Neurologic Exam     Mental Status   Oriented to person, place, and time.   Attention: normal. Concentration: normal.   Speech: speech is normal   Level of consciousness: alert    Cranial Nerves     CN II   Visual fields full to confrontation.     CN III, IV, VI   Pupils are equal, round, and reactive to light.  Extraocular motions are normal.     CN V   Facial sensation intact.     CN VII   Facial expression full, symmetric.     CN VIII   CN VIII normal.     CN IX, X   CN IX normal.     CN XI   CN XI normal.     Motor Exam   Right arm tone: normal  Left arm tone: normal  Right leg  tone: normal  Left leg tone: normal    Strength   Right deltoid: 5/5  Left deltoid: 5/5  Right biceps: 4/5  Left biceps: 5/5  Right triceps: 5/5  Left triceps: 5/5  Right wrist extension: 5/5  Left wrist extension: 5/5  Right iliopsoas: 5/5  Left iliopsoas: 5/5  Right quadriceps: 5/5  Left quadriceps: 5/5  Right anterior tibial: 5/5  Left anterior tibial: 5/5  Right gastroc: 5/5  Left gastroc: 5/5  Right EHL 5/5  Left EHL 5/5       Sensory Exam   Right arm light touch: decreased from wrist  Left arm light touch: decreased from wrist  Right leg light touch: normal  Left leg light touch: normal    Gait, Coordination, and Reflexes     Gait  Gait: normal    Tremor   Resting tremor: absent  Intention tremor: absent  Action tremor: absent    Reflexes   Right brachioradialis: 4+  Left brachioradialis: 4+  Right biceps: 4+  Left biceps: 4+  Right triceps: 4+  Left triceps: 4+  Right patellar: 3+  Left patellar: 3+  Right achilles: 2+  Left achilles: 2+  Right : 4+  Left : 4+  Right Rothman: absent  Left Rothman: absent  Right ankle clonus: absent  Left ankle clonus: absent  Right pendular knee jerk: absent  Left pendular knee jerk: absent  (12 bullet pts)    Male  strength (pounds)  AGE Right Hand RH Norms Left Hand LH Norms   20-24  121+20.6  104+21.8   25-29  120+23.0  110+16.2   30-34  121+22.4  110+21.7   35-39  119+24  113+21.7   40-44  117+20.7  112+18.7   45-49  110+23.0  101+22.8   50-54  113+18.1  102+17   55-59  101+26.7  83+23.4   60-64  90+20.4  77+20.3   65-69 95,87>82,69,72 91+20.6 *100,94>81,85,  75 76.8+19.8   70-74  75+21.5  65+18.1   75+  66+21.0  55+17.0   (VICK Stewart et al; Hand Dynometer: Effects of trials and sessions.  Perpetual and Motor Skills 61:195-8, 1985)  * = Dominant hand  > = Intervention    Peripheral Nerve Specials    Dexterity intact    Motor:   Right Left   Wrist Extension 5 5   DIP flexion 1st digit 5 5   DIP flexion 2nd digit 5 5   DIP flexion 3rd digit 5 5   DIP flexion 4th  digit 5 5   DIP flexion 5th digit 5 5   Opponens Pollicis Longus 5 5     Bulk:   Right Left   Thenar Atrophy Yes Yes   Hypothenar Atrophy No No   First Dorsal Interosseus Atrophy No No   Benedictine Sign No No   Waternberg's Sign No Yes   Prehensile , Froment's sign (Ulnar) No No     Tinel Sign:   Right Left   Cubital Tunnel equivocal equivocal   Carpal Tunnel equivocal equivocal     Results Review:   2/22/2021      AP and lateral cervical x-rays reviewed today.  No evidence of hardware complication.  He does appear to have some mild settling of his C4 screws.  But otherwise excellent postoperative films.         5/10/2021      6/14/2021        Assessment/Plan: Eduard Tam is a 70 y.o. male with a significant medical history of tobacco abuse with a 50-pack-year history.  He presents today for follow-up from an ACDF C3-4 performed on 3/9/2021 with complaints of bilateral upper extremity fatigue and paresthesias.  ROSMERY: Preop: 18, current 14.  mJOA: Preop 15, current 15.  Physical exam findings of decreased sensation of bilateral hands, bilateral thenar atrophy, right  strength 1 standard deviation below age-matched controls, right bicep +4/5, gross hyperreflexia without Rothman's or clonus, and a normal gait.  His imaging shows residual stenosis C3-4 and mild bilateral carpal tunnel.    Recommendations:  Cervical Spondylitic Myelopathy  Status post C3/4 ACDF  Mr. Tam has done fairly well since we last saw him.  He currently denies neck or upper extremity radicular pain, however reports constant bilateral hand numbness, dysesthesias, and weakness that worsens throughout the day and with use of his upper extremities.  Upon physical exam he was found to have decreased sensation of bilateral hands, bilateral thenar atrophy, right  strength 1 standard deviation below age-matched controls, right bicep +4/5, gross hyperreflexia without Rothman's or clonus, and his imaging shows residual stenosis at C3-4.   We discussed his image findings and Dr. Solis consideration for posterior decompression.  Mr. Tam is currently not interested in pursuing any surgical interventions at this time.  His overall symptoms are stable and they did not prevent him from performing his job related duties as a .  Mr. Tam would like additional time to recover and return in 6 months for reassessment.    Mild bilateral Carpal Tunnel Syndrome  Differential diagnosis: Carpal tunnel syndrome, diabetic neuropathy, thoracic outlet syndrome, complex regional pain syndrome, cervical stenosis with radiculopathy.    Mr. Tam continues to complain of numbness and tingling to the bilateral hands that worsens with use.  Again, on physical exam his dexterity was fairly well-maintained, however he was found to have decreased sensation of bilateral hands, bilateral thenar atrophy, right  strength 1 standard deviation below age-matched controls, right bicep +4/5, gross hyperreflexia without Rothman's or clonus.      As a means of conservative management for carpal tunnel I recommended a rigorous course of conservative therapy to include rest and bracing.  Rx provided for bilateral cock-up wrist splints to be worn at night.  We will have him return for reassessment with Dr. Solis after a trial of conservative management.  Mr. Tam knows to call the neurosurgical clinic to return sooner for any new or additional concerns.  Mr. Tam agrees with this plan of care.    Tobacco abuse  The patient understands the many dangers of continuing to use tobacco. Despite this, Mr. Tam states quitting is not an immediate priority at this time and declines to discuss tobacco cessation.  I reminded the patient that if quitting becomes an increased priority to contact us for help with quitting.       Diagnoses and all orders for this visit:    1. Cervical myelopathy (CMS/[facility]) (Primary)  -     Miscellaneous DME    2. Cervical spinal  stenosis  -     Miscellaneous DME    3. Bilateral carpal tunnel syndrome    4. Cigarette smoker      Return in about 6 months (around 1/12/2022).    Level of Risk: Moderate due to: mild exacerbation of one chronic illness  MDM: Moderate  (Mod = 27765, High = 22262)    Thank you, for allowing me to continue to participate in the care of this patient.    Sincerely,  DIALLO Iniguez

## 2022-01-13 ENCOUNTER — TELEPHONE (OUTPATIENT)
Dept: NEUROSURGERY | Facility: CLINIC | Age: 72
End: 2022-01-13

## 2022-01-13 NOTE — TELEPHONE ENCOUNTER
Left a vm for patient to call the office back to reschedule the appt he missed on 1/10/2022 @ 9:30am with Haroldo Cintron. I have also mailed a no show letter to the address listed in his chart.

## 2024-05-02 ENCOUNTER — OFFICE VISIT (OUTPATIENT)
Dept: FAMILY MEDICINE CLINIC | Age: 74
End: 2024-05-02
Payer: COMMERCIAL

## 2024-05-02 VITALS
DIASTOLIC BLOOD PRESSURE: 72 MMHG | WEIGHT: 136 LBS | HEART RATE: 63 BPM | HEIGHT: 67 IN | TEMPERATURE: 97.2 F | SYSTOLIC BLOOD PRESSURE: 138 MMHG | BODY MASS INDEX: 21.35 KG/M2 | OXYGEN SATURATION: 96 %

## 2024-05-02 DIAGNOSIS — Z12.5 SCREENING FOR PROSTATE CANCER: ICD-10-CM

## 2024-05-02 DIAGNOSIS — R19.8 CHANGE IN BOWEL FUNCTION: Primary | ICD-10-CM

## 2024-05-02 DIAGNOSIS — R63.4 WEIGHT LOSS: ICD-10-CM

## 2024-05-02 DIAGNOSIS — R39.198 DIFFICULTY VOIDING: ICD-10-CM

## 2024-05-02 LAB
ALBUMIN SERPL-MCNC: 3.9 G/DL (ref 3.5–5.2)
ALP SERPL-CCNC: 105 U/L (ref 40–130)
ALT SERPL-CCNC: 16 U/L (ref 5–41)
ANION GAP SERPL CALCULATED.3IONS-SCNC: 15 MMOL/L (ref 7–19)
AST SERPL-CCNC: 20 U/L (ref 5–40)
BACTERIA #/AREA URNS HPF: ABNORMAL /HPF
BASOPHILS # BLD: 0.1 K/UL (ref 0–0.2)
BASOPHILS NFR BLD: 1.3 % (ref 0–1)
BILIRUB SERPL-MCNC: 0.4 MG/DL (ref 0.2–1.2)
BILIRUB UR QL STRIP: NEGATIVE
BUN SERPL-MCNC: 11 MG/DL (ref 8–23)
CALCIUM SERPL-MCNC: 12.1 MG/DL (ref 8.8–10.2)
CHLORIDE SERPL-SCNC: 104 MMOL/L (ref 98–111)
CLARITY UR: ABNORMAL
CO2 SERPL-SCNC: 21 MMOL/L (ref 22–29)
COLOR UR: YELLOW
CREAT SERPL-MCNC: 0.9 MG/DL (ref 0.5–1.2)
CRYSTALS URNS MICRO: ABNORMAL /HPF
EOSINOPHIL # BLD: 0.2 K/UL (ref 0–0.6)
EOSINOPHIL NFR BLD: 2.9 % (ref 0–5)
ERYTHROCYTE [DISTWIDTH] IN BLOOD BY AUTOMATED COUNT: 14.1 % (ref 11.5–14.5)
GLUCOSE SERPL-MCNC: 102 MG/DL (ref 74–109)
GLUCOSE UR STRIP.AUTO-MCNC: NEGATIVE MG/DL
HCT VFR BLD AUTO: 48.4 % (ref 42–52)
HGB BLD-MCNC: 15.5 G/DL (ref 14–18)
HGB UR STRIP.AUTO-MCNC: NEGATIVE MG/L
HYALINE CASTS #/AREA URNS LPF: ABNORMAL /LPF (ref 0–5)
IMM GRANULOCYTES # BLD: 0 K/UL
KETONES UR STRIP.AUTO-MCNC: ABNORMAL MG/DL
LEUKOCYTE ESTERASE UR QL STRIP.AUTO: NEGATIVE
LYMPHOCYTES # BLD: 1.7 K/UL (ref 1.1–4.5)
LYMPHOCYTES NFR BLD: 25.1 % (ref 20–40)
MCH RBC QN AUTO: 31.2 PG (ref 27–31)
MCHC RBC AUTO-ENTMCNC: 32 G/DL (ref 33–37)
MCV RBC AUTO: 97.4 FL (ref 80–94)
MONOCYTES # BLD: 0.7 K/UL (ref 0–0.9)
MONOCYTES NFR BLD: 9.7 % (ref 0–10)
NEUTROPHILS # BLD: 4.2 K/UL (ref 1.5–7.5)
NEUTS SEG NFR BLD: 60.7 % (ref 50–65)
NITRITE UR QL STRIP.AUTO: NEGATIVE
PH UR STRIP.AUTO: 6 [PH] (ref 5–8)
PLATELET # BLD AUTO: 233 K/UL (ref 130–400)
PMV BLD AUTO: 9.8 FL (ref 9.4–12.4)
POTASSIUM SERPL-SCNC: 4 MMOL/L (ref 3.5–5)
PROT SERPL-MCNC: 7.4 G/DL (ref 6.6–8.7)
PROT UR STRIP.AUTO-MCNC: ABNORMAL MG/DL
PSA SERPL-MCNC: 3.66 NG/ML (ref 0–4)
RBC # BLD AUTO: 4.97 M/UL (ref 4.7–6.1)
RBC #/AREA URNS HPF: ABNORMAL /HPF (ref 0–2)
SODIUM SERPL-SCNC: 140 MMOL/L (ref 136–145)
SP GR UR STRIP.AUTO: 1.02 (ref 1–1.03)
UROBILINOGEN UR STRIP.AUTO-MCNC: 1 E.U./DL
WBC # BLD AUTO: 6.9 K/UL (ref 4.8–10.8)
WBC #/AREA URNS HPF: ABNORMAL /HPF (ref 0–5)

## 2024-05-02 PROCEDURE — G8427 DOCREV CUR MEDS BY ELIG CLIN: HCPCS | Performed by: NURSE PRACTITIONER

## 2024-05-02 PROCEDURE — 4004F PT TOBACCO SCREEN RCVD TLK: CPT | Performed by: NURSE PRACTITIONER

## 2024-05-02 PROCEDURE — G8420 CALC BMI NORM PARAMETERS: HCPCS | Performed by: NURSE PRACTITIONER

## 2024-05-02 PROCEDURE — 3017F COLORECTAL CA SCREEN DOC REV: CPT | Performed by: NURSE PRACTITIONER

## 2024-05-02 PROCEDURE — 99204 OFFICE O/P NEW MOD 45 MIN: CPT | Performed by: NURSE PRACTITIONER

## 2024-05-02 PROCEDURE — 1123F ACP DISCUSS/DSCN MKR DOCD: CPT | Performed by: NURSE PRACTITIONER

## 2024-05-02 RX ORDER — DOCUSATE SODIUM 100 MG/1
100 CAPSULE, LIQUID FILLED ORAL DAILY
COMMUNITY

## 2024-05-02 SDOH — ECONOMIC STABILITY: FOOD INSECURITY: WITHIN THE PAST 12 MONTHS, YOU WORRIED THAT YOUR FOOD WOULD RUN OUT BEFORE YOU GOT MONEY TO BUY MORE.: NEVER TRUE

## 2024-05-02 SDOH — ECONOMIC STABILITY: FOOD INSECURITY: WITHIN THE PAST 12 MONTHS, THE FOOD YOU BOUGHT JUST DIDN'T LAST AND YOU DIDN'T HAVE MONEY TO GET MORE.: NEVER TRUE

## 2024-05-02 SDOH — ECONOMIC STABILITY: INCOME INSECURITY: HOW HARD IS IT FOR YOU TO PAY FOR THE VERY BASICS LIKE FOOD, HOUSING, MEDICAL CARE, AND HEATING?: NOT HARD AT ALL

## 2024-05-02 SDOH — ECONOMIC STABILITY: HOUSING INSECURITY
IN THE LAST 12 MONTHS, WAS THERE A TIME WHEN YOU DID NOT HAVE A STEADY PLACE TO SLEEP OR SLEPT IN A SHELTER (INCLUDING NOW)?: NO

## 2024-05-02 ASSESSMENT — PATIENT HEALTH QUESTIONNAIRE - PHQ9
SUM OF ALL RESPONSES TO PHQ QUESTIONS 1-9: 0
SUM OF ALL RESPONSES TO PHQ QUESTIONS 1-9: 0
1. LITTLE INTEREST OR PLEASURE IN DOING THINGS: NOT AT ALL
SUM OF ALL RESPONSES TO PHQ9 QUESTIONS 1 & 2: 0
2. FEELING DOWN, DEPRESSED OR HOPELESS: NOT AT ALL
SUM OF ALL RESPONSES TO PHQ QUESTIONS 1-9: 0
SUM OF ALL RESPONSES TO PHQ QUESTIONS 1-9: 0

## 2024-05-02 ASSESSMENT — ENCOUNTER SYMPTOMS
CONSTIPATION: 1
SHORTNESS OF BREATH: 0
ABDOMINAL PAIN: 1
VOMITING: 0
TROUBLE SWALLOWING: 0
DIARRHEA: 1
BLOOD IN STOOL: 0
COUGH: 0
NAUSEA: 0

## 2024-05-02 NOTE — PROGRESS NOTES
HENT:      Head: Normocephalic and atraumatic.      Right Ear: Tympanic membrane, ear canal and external ear normal. There is no impacted cerumen.      Left Ear: Tympanic membrane, ear canal and external ear normal. There is no impacted cerumen.      Nose: Nose normal.      Mouth/Throat:      Lips: Pink.      Mouth: Mucous membranes are moist.      Dentition: Normal dentition.      Tongue: No lesions.      Pharynx: Oropharynx is clear. Uvula midline.      Tonsils: No tonsillar exudate or tonsillar abscesses.   Eyes:      General: Lids are normal.         Right eye: No discharge.         Left eye: No discharge.      Extraocular Movements:      Right eye: Normal extraocular motion.      Left eye: Normal extraocular motion.      Conjunctiva/sclera: Conjunctivae normal.      Right eye: Right conjunctiva is not injected.      Left eye: Left conjunctiva is not injected.      Pupils: Pupils are equal, round, and reactive to light.   Neck:      Thyroid: No thyromegaly.      Vascular: No carotid bruit or JVD.   Cardiovascular:      Rate and Rhythm: Normal rate and regular rhythm.      Pulses:           Carotid pulses are 2+ on the right side and 2+ on the left side.       Radial pulses are 2+ on the right side and 2+ on the left side.      Heart sounds: Normal heart sounds, S1 normal and S2 normal. No murmur heard.  Pulmonary:      Effort: Pulmonary effort is normal. No accessory muscle usage.      Breath sounds: Normal breath sounds.   Abdominal:      General: Bowel sounds are normal. There is no distension or abdominal bruit.      Palpations: Abdomen is soft. There is no mass.      Tenderness: There is no abdominal tenderness.      Hernia: No hernia is present.   Musculoskeletal:         General: Normal range of motion.      Cervical back: Normal range of motion and neck supple.      Right lower leg: No edema.      Left lower leg: No edema.   Lymphadenopathy:      Cervical:      Right cervical: No superficial cervical

## 2024-05-03 ENCOUNTER — TELEPHONE (OUTPATIENT)
Dept: FAMILY MEDICINE CLINIC | Age: 74
End: 2024-05-03

## 2024-05-03 NOTE — TELEPHONE ENCOUNTER
Received call from [employer].  Calcium level 12.1.  His calcium level was also elevated 3 years ago at 12.1.  Called patient but got his voicemail.  Left voicemail stating that his calcium level was elevated.  Recommend increasing water intake.   Recommend stopping any OTC calcium supplements and multivitamins at this time.  Also, will forward this To LIMA Daniels, PCP for further work-up.

## 2024-05-06 ENCOUNTER — HOSPITAL ENCOUNTER (OUTPATIENT)
Dept: GENERAL RADIOLOGY | Age: 74
Discharge: HOME | End: 2024-05-06
Payer: MEDICARE

## 2024-05-06 ENCOUNTER — TELEPHONE (OUTPATIENT)
Dept: FAMILY MEDICINE CLINIC | Age: 74
End: 2024-05-06

## 2024-05-06 ENCOUNTER — OFFICE VISIT (OUTPATIENT)
Dept: FAMILY MEDICINE CLINIC | Age: 74
End: 2024-05-06
Payer: MEDICARE

## 2024-05-06 VITALS
WEIGHT: 136 LBS | OXYGEN SATURATION: 95 % | SYSTOLIC BLOOD PRESSURE: 124 MMHG | BODY MASS INDEX: 21.3 KG/M2 | DIASTOLIC BLOOD PRESSURE: 72 MMHG | TEMPERATURE: 97.7 F | HEART RATE: 67 BPM

## 2024-05-06 DIAGNOSIS — R05.9 COUGH, UNSPECIFIED TYPE: ICD-10-CM

## 2024-05-06 DIAGNOSIS — F17.200 SMOKER: ICD-10-CM

## 2024-05-06 DIAGNOSIS — E83.52 HYPERCALCEMIA: ICD-10-CM

## 2024-05-06 DIAGNOSIS — E83.52 HYPERCALCEMIA: Primary | ICD-10-CM

## 2024-05-06 LAB
25(OH)D3 SERPL-MCNC: 40 NG/ML
CA-I BLD-SCNC: 1.55 MMOL/L (ref 1.12–1.32)
PTH-INTACT SERPL-MCNC: 174.5 PG/ML (ref 15–65)

## 2024-05-06 PROCEDURE — 3017F COLORECTAL CA SCREEN DOC REV: CPT | Performed by: NURSE PRACTITIONER

## 2024-05-06 PROCEDURE — 71046 X-RAY EXAM CHEST 2 VIEWS: CPT

## 2024-05-06 PROCEDURE — 1123F ACP DISCUSS/DSCN MKR DOCD: CPT | Performed by: NURSE PRACTITIONER

## 2024-05-06 PROCEDURE — 4004F PT TOBACCO SCREEN RCVD TLK: CPT | Performed by: NURSE PRACTITIONER

## 2024-05-06 PROCEDURE — G8427 DOCREV CUR MEDS BY ELIG CLIN: HCPCS | Performed by: NURSE PRACTITIONER

## 2024-05-06 PROCEDURE — 99213 OFFICE O/P EST LOW 20 MIN: CPT | Performed by: NURSE PRACTITIONER

## 2024-05-06 PROCEDURE — G8420 CALC BMI NORM PARAMETERS: HCPCS | Performed by: NURSE PRACTITIONER

## 2024-05-06 ASSESSMENT — ENCOUNTER SYMPTOMS
DIARRHEA: 1
CONSTIPATION: 1
COUGH: 1
SHORTNESS OF BREATH: 1
RECTAL PAIN: 1

## 2024-05-06 NOTE — TELEPHONE ENCOUNTER
----- Message from LIMA Driscoll sent at 5/6/2024  7:57 AM CDT -----  Elevated calcium level was called provider on-call attempted to get in touch with him lets continue to try and get in touch with him I like for him to come in and to discuss further

## 2024-05-06 NOTE — PROGRESS NOTES
SUBJECTIVE:  Labs abnormal   Patient ID: John Rueda is a 73 y.o. male.    HPI:   HPI   Mr. Rueda comes in today at my request.  He has abnormal labs with a very elevated calcium level which I would like to evaluate further.  He states he did get notified that the calcium was elevated and he is limited his vitamins he is no longer taking in milk and also certain foods.  Has limited Calcium and also Vitamins.   Limited milk.   Smoker for 57 years 1 pk a day.  Does have a little bit of a cough in the morning  Of note and some concerns that the office visit visit prior to this week he has had weight loss however his wife passed away and he no longer really eats at home he just cooks just a couple of meals at home and the rest he is eating out.  Having some problems with defecation of his lower bowel which we do have an appointment with him set up with GI he states that the appointment time is May 15.      Past Medical History:   Diagnosis Date    ED (erectile dysfunction)     GERD (gastroesophageal reflux disease)     Seizures (HCC)       Prior to Visit Medications    Medication Sig Taking? Authorizing Provider   docusate sodium (COLACE) 100 MG capsule Take 1 capsule by mouth daily  Patient not taking: Reported on 5/6/2024  Kendrick Ramirez MD   Multiple Vitamins-Minerals (MULTIVITAMIN & MINERAL PO) Take 1 tablet by mouth daily  Patient not taking: Reported on 5/6/2024  Kendrick Ramirez MD      No Known Allergies    Review of Systems   Constitutional:  Positive for appetite change, diaphoresis and unexpected weight change.   Respiratory:  Positive for cough and shortness of breath.    Cardiovascular:  Negative for chest pain and palpitations.   Gastrointestinal:  Positive for constipation, diarrhea and rectal pain.       OBJECTIVE:    Physical Exam  Constitutional:       Appearance: Normal appearance. He is well-developed. He is not ill-appearing.   HENT:      Head: Normocephalic and atraumatic.

## 2024-05-07 ENCOUNTER — TELEPHONE (OUTPATIENT)
Dept: FAMILY MEDICINE CLINIC | Age: 74
End: 2024-05-07

## 2024-05-07 DIAGNOSIS — E83.52 HYPERCALCEMIA: Primary | ICD-10-CM

## 2024-05-07 DIAGNOSIS — R79.89 ELEVATED PARATHYROID HORMONE: ICD-10-CM

## 2024-05-07 NOTE — TELEPHONE ENCOUNTER
Called patient, verified name and date of birth, and gave results in detail as per provider notes below. Patient verbalized understanding and had no further questions.    Patient states he has reviewed results through [email or web address]. Scheduling number given to patient for CT.

## 2024-05-07 NOTE — TELEPHONE ENCOUNTER
----- Message from LIMA Driscoll sent at 5/7/2024  7:09 AM CDT -----  Ionized calcium is elevated parathyroid hormone is elevated however the thyroid levels are normal vitamin D is also normal I would like to do a CT of his neck to further evaluate the parathyroid hormone

## 2024-05-09 ENCOUNTER — TELEPHONE (OUTPATIENT)
Dept: FAMILY MEDICINE CLINIC | Age: 74
End: 2024-05-09

## 2024-05-09 DIAGNOSIS — R91.1 PULMONARY NODULE: Primary | ICD-10-CM

## 2024-05-09 NOTE — TELEPHONE ENCOUNTER
Called patient, verified name and date of birth, and gave results in detail as per provider notes below. Patient verbalized understanding and had no further questions.    Patient agrees to CT. Order pended.

## 2024-05-09 NOTE — TELEPHONE ENCOUNTER
----- Message from LIMA Driscoll sent at 5/9/2024  7:39 AM CDT -----  Chest x-ray did show a nodule in the right lung base recommending a CT of the chest

## 2024-05-13 ENCOUNTER — HOSPITAL ENCOUNTER (OUTPATIENT)
Dept: GENERAL RADIOLOGY | Age: 74
Discharge: HOME | End: 2024-05-13
Payer: MEDICARE

## 2024-05-13 DIAGNOSIS — E83.52 HYPERCALCEMIA: ICD-10-CM

## 2024-05-13 DIAGNOSIS — R79.89 ELEVATED PARATHYROID HORMONE: ICD-10-CM

## 2024-05-13 PROCEDURE — 70492 CT SFT TSUE NCK W/O & W/DYE: CPT

## 2024-05-13 PROCEDURE — 6360000004 HC RX CONTRAST MEDICATION: Performed by: NURSE PRACTITIONER

## 2024-05-13 RX ADMIN — IOPAMIDOL 75 ML: 755 INJECTION, SOLUTION INTRAVENOUS at 11:59

## 2024-05-15 ENCOUNTER — OFFICE VISIT (OUTPATIENT)
Dept: GASTROENTEROLOGY | Age: 74
End: 2024-05-15
Payer: MEDICARE

## 2024-05-15 VITALS
SYSTOLIC BLOOD PRESSURE: 120 MMHG | DIASTOLIC BLOOD PRESSURE: 70 MMHG | WEIGHT: 137 LBS | BODY MASS INDEX: 21.5 KG/M2 | HEIGHT: 67 IN | HEART RATE: 67 BPM | OXYGEN SATURATION: 96 %

## 2024-05-15 DIAGNOSIS — R19.4 CHANGE IN BOWEL HABITS: Primary | ICD-10-CM

## 2024-05-15 DIAGNOSIS — Z12.11 COLON CANCER SCREENING: ICD-10-CM

## 2024-05-15 PROCEDURE — 4004F PT TOBACCO SCREEN RCVD TLK: CPT | Performed by: NURSE PRACTITIONER

## 2024-05-15 PROCEDURE — G8420 CALC BMI NORM PARAMETERS: HCPCS | Performed by: NURSE PRACTITIONER

## 2024-05-15 PROCEDURE — 3017F COLORECTAL CA SCREEN DOC REV: CPT | Performed by: NURSE PRACTITIONER

## 2024-05-15 PROCEDURE — 99204 OFFICE O/P NEW MOD 45 MIN: CPT | Performed by: NURSE PRACTITIONER

## 2024-05-15 PROCEDURE — G8427 DOCREV CUR MEDS BY ELIG CLIN: HCPCS | Performed by: NURSE PRACTITIONER

## 2024-05-15 PROCEDURE — 1123F ACP DISCUSS/DSCN MKR DOCD: CPT | Performed by: NURSE PRACTITIONER

## 2024-05-15 ASSESSMENT — ENCOUNTER SYMPTOMS
ABDOMINAL DISTENTION: 0
CHOKING: 0
COUGH: 0
CONSTIPATION: 0
DIARRHEA: 0
VOMITING: 0
TROUBLE SWALLOWING: 0
SHORTNESS OF BREATH: 0
ANAL BLEEDING: 0
RECTAL PAIN: 0
NAUSEA: 0
BLOOD IN STOOL: 0
ABDOMINAL PAIN: 1

## 2024-05-15 NOTE — PATIENT INSTRUCTIONS
When should you call your doctor?   You have questions or concerns.     You don't understand how to prepare for your procedure.     You are having trouble with the bowel prep.     You become ill before the procedure (such as fever, flu, or a cold).     You need to reschedule or have changed your mind about having the procedure.   Where can you learn more?  Go to https://www.[employer]/patientEd and enter C315 to learn more about \"Colonoscopy: Before Your Procedure.\"  Current as of: May 4, 2022               Content Version: 13.5  © 0611-0321 [email or web address].   Care instructions adapted under license by [employer]. If you have questions about a medical condition or this instruction, always ask your healthcare professional. [employer] disclaims any warranty or liability for your use of this information.         
none

## 2024-05-15 NOTE — PROGRESS NOTES
Subjective:     Patient ID: John Rueda is a 73 y.o. male  PCP: Shannon Trujillo APRN  Referring Provider: Shannon Trujillo APRN    HPI  Patient presents to the office today with the following complaints: Weight Loss and Other (Change in bowel )      Patient seen in the office today with complaints of a change in his bowel habits and weight loss  Reports constipation started about 1 year ago and he has been using a suppository to have a bowel movement  States he tried stool softeners and they do not work for him   Right lower abd cramping at times when he needs to have a bowel movement     He was taken off all of his vitamins the past 1 week and he states his stools are softer now.     Last colonoscopy was in 2015 with a recommended 10 year recall   Denies upper GI issues   Assessment:     1. Change in bowel habits  2. Colon cancer screening       Review of Systems   Constitutional:  Negative for activity change, appetite change, fatigue, fever and unexpected weight change.   HENT:  Negative for trouble swallowing.    Respiratory:  Negative for cough, choking and shortness of breath.    Cardiovascular:  Negative for chest pain.   Gastrointestinal:  Positive for abdominal pain (right lower abd cramping at times). Negative for abdominal distention, anal bleeding, blood in stool, constipation, diarrhea, nausea, rectal pain and vomiting.   Allergic/Immunologic: Negative for food allergies.   All other systems reviewed and are negative.      Plan:   Schedule Colonoscopy  Instruct on bowel prep.   Nothing to eat or drink after midnight the day of the exam.  Unable to drive for 24 hours after the procedure.   No aspirin or nonsteroidal anti-inflammatories for 5 days before procedure.  I have discussed the benefits, alternatives, and risks (including bleeding, perforation and death)  for pursuing Endoscopy (EGD/Colonscopy/EUS/ERCP) with the patient and they are willing to continue. We also discussed the need for

## 2024-05-20 ENCOUNTER — HOSPITAL ENCOUNTER (OUTPATIENT)
Dept: GENERAL RADIOLOGY | Age: 74
Discharge: HOME | End: 2024-05-20
Payer: MEDICARE

## 2024-05-20 DIAGNOSIS — J32.0 RIGHT MAXILLARY SINUSITIS: Primary | ICD-10-CM

## 2024-05-20 DIAGNOSIS — R91.1 PULMONARY NODULE: ICD-10-CM

## 2024-05-20 PROCEDURE — 71270 CT THORAX DX C-/C+: CPT

## 2024-05-20 PROCEDURE — 6360000004 HC RX CONTRAST MEDICATION: Performed by: NURSE PRACTITIONER

## 2024-05-20 RX ORDER — AMOXICILLIN AND CLAVULANATE POTASSIUM 875; 125 MG/1; MG/1
1 TABLET, FILM COATED ORAL 2 TIMES DAILY
Qty: 42 TABLET | Refills: 0 | Status: SHIPPED | OUTPATIENT
Start: 2024-05-20 | End: 2024-06-10

## 2024-05-20 RX ADMIN — IOPAMIDOL 75 ML: 755 INJECTION, SOLUTION INTRAVENOUS at 11:31

## 2024-05-21 ENCOUNTER — TELEPHONE (OUTPATIENT)
Dept: FAMILY MEDICINE CLINIC | Age: 74
End: 2024-05-21

## 2024-05-21 DIAGNOSIS — E21.5 PARATHYROID ABNORMALITY (HCC): Primary | ICD-10-CM

## 2024-05-21 NOTE — TELEPHONE ENCOUNTER
Called patient, verified name and date of birth, and gave results in detail as per provider notes below. Patient verbalized understanding and had no further questions.    Patient agrees to ENT referral. No preference. Order pended to [facility].

## 2024-05-21 NOTE — TELEPHONE ENCOUNTER
----- Message from LIMA Driscoll sent at 5/20/2024  7:37 AM CDT -----  Patient is needing a referral to ENT.  Possibly parathyroid adenoma or also represent a necrotic lymph node or aches pick thyroid nodule that is 2.8 cm  It appears also that he has a infection in his right maxillary alcohol and antibiotic

## 2024-06-03 ENCOUNTER — TELEPHONE (OUTPATIENT)
Dept: FAMILY MEDICINE CLINIC | Age: 74
End: 2024-06-03

## 2024-06-03 NOTE — TELEPHONE ENCOUNTER
----- Message from LIMA Driscoll sent at 5/30/2024  7:35 AM CDT -----  CT of chest did not identify any pulmonary nodules does have chronic obstructive lung disease and course of the thyroid meds which we are currently working

## 2024-06-06 ENCOUNTER — ANESTHESIA EVENT (OUTPATIENT)
Dept: OPERATING ROOM | Age: 74
End: 2024-06-06

## 2024-06-10 ENCOUNTER — APPOINTMENT (OUTPATIENT)
Dept: OPERATING ROOM | Age: 74
End: 2024-06-10
Attending: INTERNAL MEDICINE

## 2024-06-10 ENCOUNTER — ANESTHESIA (OUTPATIENT)
Dept: OPERATING ROOM | Age: 74
End: 2024-06-10

## 2024-06-10 ENCOUNTER — HOSPITAL ENCOUNTER (OUTPATIENT)
Age: 74
Setting detail: OUTPATIENT SURGERY
Discharge: HOME | End: 2024-06-10
Attending: INTERNAL MEDICINE | Admitting: INTERNAL MEDICINE

## 2024-06-10 VITALS
BODY MASS INDEX: 20.46 KG/M2 | DIASTOLIC BLOOD PRESSURE: 70 MMHG | RESPIRATION RATE: 16 BRPM | TEMPERATURE: 97.2 F | OXYGEN SATURATION: 96 % | WEIGHT: 135 LBS | HEART RATE: 53 BPM | SYSTOLIC BLOOD PRESSURE: 134 MMHG | HEIGHT: 68 IN

## 2024-06-10 PROCEDURE — G8918 PT W/O PREOP ORDER IV AB PRO: HCPCS

## 2024-06-10 PROCEDURE — 45378 DIAGNOSTIC COLONOSCOPY: CPT

## 2024-06-10 PROCEDURE — G8907 PT DOC NO EVENTS ON DISCHARG: HCPCS

## 2024-06-10 RX ORDER — SODIUM CHLORIDE, SODIUM LACTATE, POTASSIUM CHLORIDE, CALCIUM CHLORIDE 600; 310; 30; 20 MG/100ML; MG/100ML; MG/100ML; MG/100ML
INJECTION, SOLUTION INTRAVENOUS CONTINUOUS
Status: DISCONTINUED | OUTPATIENT
Start: 2024-06-10 | End: 2024-06-10 | Stop reason: HOSPADM

## 2024-06-10 RX ORDER — PROPOFOL 10 MG/ML
INJECTION, EMULSION INTRAVENOUS PRN
Status: DISCONTINUED | OUTPATIENT
Start: 2024-06-10 | End: 2024-06-10 | Stop reason: SDUPTHER

## 2024-06-10 RX ORDER — LIDOCAINE HYDROCHLORIDE 10 MG/ML
INJECTION, SOLUTION EPIDURAL; INFILTRATION; INTRACAUDAL; PERINEURAL PRN
Status: DISCONTINUED | OUTPATIENT
Start: 2024-06-10 | End: 2024-06-10 | Stop reason: SDUPTHER

## 2024-06-10 RX ADMIN — SODIUM CHLORIDE, SODIUM LACTATE, POTASSIUM CHLORIDE, CALCIUM CHLORIDE: 600; 310; 30; 20 INJECTION, SOLUTION INTRAVENOUS at 12:05

## 2024-06-10 RX ADMIN — PROPOFOL 80 MG: 10 INJECTION, EMULSION INTRAVENOUS at 13:00

## 2024-06-10 RX ADMIN — PROPOFOL 20 MG: 10 INJECTION, EMULSION INTRAVENOUS at 13:02

## 2024-06-10 RX ADMIN — PROPOFOL 20 MG: 10 INJECTION, EMULSION INTRAVENOUS at 13:06

## 2024-06-10 RX ADMIN — PROPOFOL 20 MG: 10 INJECTION, EMULSION INTRAVENOUS at 13:09

## 2024-06-10 RX ADMIN — LIDOCAINE HYDROCHLORIDE 50 MG: 10 INJECTION, SOLUTION EPIDURAL; INFILTRATION; INTRACAUDAL; PERINEURAL at 13:00

## 2024-06-10 ASSESSMENT — PAIN - FUNCTIONAL ASSESSMENT
PAIN_FUNCTIONAL_ASSESSMENT: NONE - DENIES PAIN

## 2024-06-10 ASSESSMENT — LIFESTYLE VARIABLES: SMOKING_STATUS: 1

## 2024-06-10 NOTE — OP NOTE
Patient: John Rueda : 1950  Med Rec#: [id number] Acc#: [id number]   Primary Care Provider Shannon Trujillo APRN    Date of Procedure:  6/10/2024    Endoscopist: Prabhakar Carson MD    Referring Provider: Shannon Trujillo APRN, LIMA Loja    Operation Performed: Colonoscopy     Indications: change in bowel habits- constipation    Anesthesia:  Sedation was administered by anesthesia who monitored the patient during the procedure.    I met with John Rueda prior to procedure. We discussed the procedure itself, and I have discussed the risks of endoscopy (including-- but not limited to-- pain, discomfort, bleeding potentially requiring second endoscopic procedure and/or blood transfusion, organ perforation requiring operative repair, damage to organs near the colon, infection, aspiration, cardiopulmonary/allergic reaction), benefits, indications to endoscopy. Additionally, we discussed options other than colonoscopy. The patient expressed understanding. All questions answered. The patient decided to proceed with the procedure.  Signed informed consent was placed on the chart.    Blood Loss: minimal    Withdrawal time: n/a  Bowel Prep: adequate     Complications: no immediate complications    DESCRIPTION OF PROCEDURE:     A time out was performed. After written informed consent was obtained, the patient was placed in the left lateral position.     The perianal area was inspected, and a digital rectal exam was performed. A rectal exam was performed: normal tone, no palpable lesions. At this point, a forward viewing Olympus colonoscope was inserted into the anus and carefully advanced to the cecum.  The cecum was identified by the ileocecal valve and the appendiceal orifice. The colonoscope was then slowly withdrawn with careful inspection of the mucosa in a linear and circumferential fashion. The scope was retroflexed in the rectum. Suction was utilized during the procedure to remove as

## 2024-06-10 NOTE — DISCHARGE INSTRUCTIONS
Recommendations:  1. Repeat colonoscopy: 10 years for CRC screening  2. Miralax daily    POST-OP ORDERS: ENDOSCOPY & COLONOSCOPY:    1. Rest today.    2. DO NOT eat or drink until wide awake; eat your usual diet today in moderate amount only.    3. DO NOT drive today.    4. Call physician if you have severe pain, vomiting, fever, rectal bleeding or black bowel movements.    5.  If a biopsy was taken or a polyp removed, you should expect to hear results in about 7-10 days.  If you have heard nothing from your physician by then, call the office for results.    6.  Discharge home when patient awake, vitals signs stable and tolerating liquids.    7. Call with questions or concerns [phone].

## 2024-06-10 NOTE — H&P
Patient Name: John Rueda  : 1950  MRN: [id number]  DATE: 06/10/24    Allergies: No Known Allergies     ENDOSCOPY  History and Physical    Procedure:    [x] Diagnostic Colonoscopy       [] Screening Colonoscopy  [] EGD      [] ERCP      [] EUS       [] Other    [x] Previous office notes/History and Physical reviewed from the patients chart. Please see EMR for further details of HPI. I have examined the patient's status immediately prior to the procedure and:      Indications/HPI:    []Abdominal Pain   []Barretts  []Screening/Surveillance   []History of Polyps  []Dysphagia            [] +Cologard/DNA testing  []Abnormal Imaging              []EOE Hx              [] Family Hx of CRC/Polyps  []Anemia                            []Food Impaction       []Recent Poor Prep  []GI Bleed             []Lymphadenopathy  []History of Polyps  [x]Change in bowel habits []Heartburn/Reflux  []Cancer- GI/Lung  []Chest Pain - Non Cardiac []Heme (+) Stool []Ulcers  [x]Constipation  []Hemoptysis  []Incontinence    []Diarrhea  []Hypoxemia  []Rectal Bleed (BRBPR)  []Nausea/Vomiting   [] Varices  []Crohns/Colitis  []Pancreatic Cyst   [] Cirrhosis   []Pancreatitis    []Abnormal MRCP  []Elevated LFT [] Stent Removal, Previous ERCP  []Other:     Anesthesia:   [x] MAC [] Moderate Sedation   [] General   [] None     ROS: 12 pt Review of Symptoms was negative unless mentioned above    Medications:   Prior to Admission medications    Medication Sig Start Date End Date Taking? Authorizing Provider   amoxicillin-clavulanate (AUGMENTIN) 875-125 MG per tablet Take 1 tablet by mouth 2 times daily for 21 days 5/20/24 6/10/24  Shannon Trujillo APRN       Past Medical History:  Past Medical History:   Diagnosis Date    ED (erectile dysfunction)     GERD (gastroesophageal reflux disease)     Seizures (HCC)        Past Surgical History:  Past Surgical History:   Procedure Laterality Date    COLONOSCOPY  2015    Dr Alanis:  normal

## 2024-06-10 NOTE — ANESTHESIA POSTPROCEDURE EVALUATION
Department of Anesthesiology  Postprocedure Note    Patient: John Rueda  MRN: [id number]  YOB: 1950  Date of evaluation: 6/10/2024    Procedure Summary       Date: 06/10/24 Room / Location: [place] [id number] / [facility]    Anesthesia Start: 1256 Anesthesia Stop: 1319    Procedure: COLORECTAL CANCER SCREENING, NOT HIGH RISK (Abdomen) Diagnosis:       Change in bowel habits      Constipation, unspecified constipation type      (Change in bowel habits [R19.4])      (Constipation, unspecified constipation type [K59.00])    Surgeons: Prabhakar Carson MD Responsible Provider: Shannon Sanchez APRN - CRNA    Anesthesia Type: general, TIVA ASA Status: 2            Anesthesia Type: No value filed.    Enzo Phase I:      Enzo Phase II:      Anesthesia Post Evaluation    Patient location during evaluation: bedside  Patient participation: complete - patient participated  Level of consciousness: sleepy but conscious  Pain score: 0  Airway patency: patent  Nausea & Vomiting: no vomiting and no nausea  Cardiovascular status: blood pressure returned to baseline  Respiratory status: acceptable  Hydration status: stable  Pain management: adequate    No notable events documented.

## 2024-06-10 NOTE — ANESTHESIA PRE PROCEDURE
Department of Anesthesiology  Preprocedure Note       Name:  John Rueda   Age:  73 y.o.  :  1950                                          MRN:  [id number]         Date:  6/10/2024      Surgeon: Surgeon(s):  Prabhakar Carson MD    Procedure: COLORECTAL CANCER SCREENING, NOT HIGH RISK (Abdomen)    Medications prior to admission:   Prior to Admission medications    Medication Sig Start Date End Date Taking? Authorizing Provider   amoxicillin-clavulanate (AUGMENTIN) 875-125 MG per tablet Take 1 tablet by mouth 2 times daily for 21 days 5/20/24 6/10/24  Shannon Trujillo APRN       Current medications:    Current Facility-Administered Medications   Medication Dose Route Frequency Provider Last Rate Last Admin    lactated ringers IV soln infusion   IntraVENous Continuous Prabhakar Carson  mL/hr at 06/10/24 1205 New Bag at 06/10/24 1205       Allergies:  No Known Allergies    Problem List:    Patient Active Problem List   Diagnosis Code    Non-recurrent bilateral inguinal hernia without obstruction or gangrene K40.20         Past Medical History:        Diagnosis Date    ED (erectile dysfunction)     GERD (gastroesophageal reflux disease)     Seizures (HCC)        Past Surgical History:        Procedure Laterality Date    COLONOSCOPY  2015    Dr Alanis:  normal 10yr recall    HERNIA REPAIR Bilateral 2019    LAPAROSCOPIC BILATERAL INGUINAL HERNIA REPAIR WITH MESH performed by William Oakley MD at [facility] OR       Social History:    Social History     Tobacco Use    Smoking status: Every Day     Current packs/day: 1.00     Average packs/day: 1 pack/day for 50.0 years (50.0 ttl pk-yrs)     Types: Cigarettes    Smokeless tobacco: Never   Substance Use Topics    Alcohol use: Yes     Alcohol/week: 7.0 standard drinks of alcohol     Types: 7 Drinks containing 0.5 oz of alcohol per week     Comment: social                                Ready to quit: Not Answered  Counseling given: Not

## 2024-08-06 ENCOUNTER — OFFICE VISIT (OUTPATIENT)
Dept: FAMILY MEDICINE CLINIC | Age: 74
End: 2024-08-06
Payer: COMMERCIAL

## 2024-08-06 VITALS
DIASTOLIC BLOOD PRESSURE: 64 MMHG | OXYGEN SATURATION: 96 % | WEIGHT: 133 LBS | HEART RATE: 60 BPM | TEMPERATURE: 98.5 F | HEIGHT: 68 IN | OXYGEN SATURATION: 96 % | BODY MASS INDEX: 20.16 KG/M2 | SYSTOLIC BLOOD PRESSURE: 118 MMHG | WEIGHT: 133 LBS | SYSTOLIC BLOOD PRESSURE: 118 MMHG | TEMPERATURE: 98.5 F | HEIGHT: 68 IN | HEART RATE: 60 BPM | BODY MASS INDEX: 20.16 KG/M2 | DIASTOLIC BLOOD PRESSURE: 64 MMHG

## 2024-08-06 DIAGNOSIS — Z13.6 SCREENING FOR CARDIOVASCULAR CONDITION: ICD-10-CM

## 2024-08-06 DIAGNOSIS — Z87.891 PERSONAL HISTORY OF TOBACCO USE, PRESENTING HAZARDS TO HEALTH: ICD-10-CM

## 2024-08-06 DIAGNOSIS — R00.1 BRADYCARDIA ON ECG: Primary | ICD-10-CM

## 2024-08-06 DIAGNOSIS — E21.5 PARATHYROID ABNORMALITY (HCC): ICD-10-CM

## 2024-08-06 DIAGNOSIS — E21.5 PARATHYROID ABNORMALITY (HCC): Primary | ICD-10-CM

## 2024-08-06 DIAGNOSIS — Z00.00 INITIAL MEDICARE ANNUAL WELLNESS VISIT: Primary | ICD-10-CM

## 2024-08-06 LAB
CA-I BLD-SCNC: 1.48 MMOL/L (ref 1.12–1.32)
CHOLEST SERPL-MCNC: 191 MG/DL (ref 160–199)
HDLC SERPL-MCNC: 44 MG/DL (ref 55–121)
LDLC SERPL CALC-MCNC: 124 MG/DL
PTH-INTACT SERPL-MCNC: 207.8 PG/ML (ref 15–65)
TRIGL SERPL-MCNC: 113 MG/DL (ref 0–149)

## 2024-08-06 PROCEDURE — 4004F PT TOBACCO SCREEN RCVD TLK: CPT | Performed by: NURSE PRACTITIONER

## 2024-08-06 PROCEDURE — 3017F COLORECTAL CA SCREEN DOC REV: CPT | Performed by: NURSE PRACTITIONER

## 2024-08-06 PROCEDURE — 1123F ACP DISCUSS/DSCN MKR DOCD: CPT | Performed by: NURSE PRACTITIONER

## 2024-08-06 PROCEDURE — G8427 DOCREV CUR MEDS BY ELIG CLIN: HCPCS | Performed by: NURSE PRACTITIONER

## 2024-08-06 PROCEDURE — G8420 CALC BMI NORM PARAMETERS: HCPCS | Performed by: NURSE PRACTITIONER

## 2024-08-06 PROCEDURE — 99213 OFFICE O/P EST LOW 20 MIN: CPT | Performed by: NURSE PRACTITIONER

## 2024-08-06 PROCEDURE — G0438 PPPS, INITIAL VISIT: HCPCS | Performed by: NURSE PRACTITIONER

## 2024-08-06 PROCEDURE — 93000 ELECTROCARDIOGRAM COMPLETE: CPT | Performed by: NURSE PRACTITIONER

## 2024-08-06 PROCEDURE — 99406 BEHAV CHNG SMOKING 3-10 MIN: CPT | Performed by: NURSE PRACTITIONER

## 2024-08-06 ASSESSMENT — LIFESTYLE VARIABLES
HOW OFTEN DURING THE LAST YEAR HAVE YOU FAILED TO DO WHAT WAS NORMALLY EXPECTED FROM YOU BECAUSE OF DRINKING: NEVER
HOW OFTEN DURING THE LAST YEAR HAVE YOU FOUND THAT YOU WERE NOT ABLE TO STOP DRINKING ONCE YOU HAD STARTED: NEVER
HOW OFTEN DURING THE LAST YEAR HAVE YOU BEEN UNABLE TO REMEMBER WHAT HAPPENED THE NIGHT BEFORE BECAUSE YOU HAD BEEN DRINKING: NEVER
HOW OFTEN DURING THE LAST YEAR HAVE YOU NEEDED AN ALCOHOLIC DRINK FIRST THING IN THE MORNING TO GET YOURSELF GOING AFTER A NIGHT OF HEAVY DRINKING: NEVER
HOW MANY STANDARD DRINKS CONTAINING ALCOHOL DO YOU HAVE ON A TYPICAL DAY: 1 OR 2
HAS A RELATIVE, FRIEND, DOCTOR, OR ANOTHER HEALTH PROFESSIONAL EXPRESSED CONCERN ABOUT YOUR DRINKING OR SUGGESTED YOU CUT DOWN: NO
HOW OFTEN DURING THE LAST YEAR HAVE YOU HAD A FEELING OF GUILT OR REMORSE AFTER DRINKING: NEVER
HOW OFTEN DO YOU HAVE A DRINK CONTAINING ALCOHOL: 4 OR MORE TIMES A WEEK
HAVE YOU OR SOMEONE ELSE BEEN INJURED AS A RESULT OF YOUR DRINKING: NO

## 2024-08-06 ASSESSMENT — PATIENT HEALTH QUESTIONNAIRE - PHQ9
SUM OF ALL RESPONSES TO PHQ QUESTIONS 1-9: 0
2. FEELING DOWN, DEPRESSED OR HOPELESS: NOT AT ALL
1. LITTLE INTEREST OR PLEASURE IN DOING THINGS: NOT AT ALL
SUM OF ALL RESPONSES TO PHQ9 QUESTIONS 1 & 2: 0
SUM OF ALL RESPONSES TO PHQ QUESTIONS 1-9: 0

## 2024-08-06 ASSESSMENT — ENCOUNTER SYMPTOMS
DIARRHEA: 1
COUGH: 0
NAUSEA: 0
SHORTNESS OF BREATH: 1

## 2024-08-06 NOTE — PROGRESS NOTES
Medicare Annual Wellness Visit    John Rueda is here for Medicare AWV and Ear Fullness (Left ear. White stuff and wax. Hearing is coming back.)    Assessment & Plan   Initial Medicare annual wellness visit  -     EKG 12 Lead  Personal history of tobacco use, presenting hazards to health  -     [place] Smoking and Tobacco Use Cessation (Intermediate): 3-10 MINUTES [08175]  Screening for cardiovascular condition  -     Lipid Panel; Future    Recommendations for Preventive Services Due: see orders and patient instructions/AVS.  Recommended screening schedule for the next 5-10 years is provided to the patient in written form: see Patient Instructions/AVS.     Return in 1 year (on 8/6/2025) for Medicare AWV.     Subjective   The following acute and/or chronic problems were also addressed today:  See notes    Patient's complete Health Risk Assessment and screening values have been reviewed and are found in Flowsheets. The following problems were reviewed today and where indicated follow up appointments were made and/or referrals ordered.    Positive Risk Factor Screenings with Interventions:    Fall Risk:  Do you feel unsteady or are you worried about falling? : no  2 or more falls in past year?: (!) yes  Fall with injury in past year?: no     Interventions:    Reviewed medications, home hazards, visual acuity, and co-morbidities that can increase risk for falls  Has not fallen            General HRA Questions:  Select all that apply: (!) New or Increased Fatigue  Interventions Fatigue:  Wroks on a daily basis        Poor Eating Habits/Diet:  Do you eat balanced/healthy meals regularly?: (!) No  Interventions:  No appetite is aware of diet      Dentist Screen:  Have you seen the dentist within the past year?: (!) No    Intervention:  Patient declines any further evaluation or treatment     Vision Screen:  Do you have difficulty driving, watching TV, or doing any of your daily activities because of your eyesight?: No  Have

## 2024-08-06 NOTE — PROGRESS NOTES
Chief Complaint   Patient presents with   • Weakness        HPI:     Patient is a diabetic 89 y/o male [place] resident, hx RAD, pacemaker, prostate CA, and AFIB, who presents to the ED via EMS c/o weakness onset this morning. This morning pt went to use the washroom and felt weak standing up from his bed. He later was sitting in a chair after breakfast and when he tried to get up he felt weak again. He says the way he felt is hard to explain but describes it as SOB and general weakness. The two episodes of weakness did not last long and he denies feeling near syncopal during them. He denies pain. Last night pt remembers having difficulty urinating and says this morning he was able to. Right now pt says he feels normal and denies any recent illness. He denies headache, N/V, back pain, abdominal pain, chest pain, or any other complaints at this time.    Review of Systems   Constitutional: Negative for fever.   Respiratory: Positive for shortness of breath.    Cardiovascular: Negative for chest pain.   Gastrointestinal: Negative for abdominal pain, nausea and vomiting.   Neurological: Positive for weakness.   All other systems reviewed and are negative.       PAST MEDICAL HISTORY:    Diabetes mellitus (CMS/HCC)                                   RAD (reactive airway disease)                                 Cardiac pacemaker                                             Prostate cancer (CMS/HCC)                                     A-fib (CMS/HCC)                                               PAST SURGICAL HISTORY:    PROSTATE SURGERY                                              CARDIAC CATHETERIZATION                                       Social History     Tobacco Use   • Smoking status: Never Smoker   • Smokeless tobacco: Never Used   Substance Use Topics   • Alcohol use: Never     Frequency: Never   • Drug use: Never          ED Triage Vitals [12/23/19 0941]   /75   Pulse 110   Resp 16   Temp 97.2 °F (36.2 °C)    SpO2 98 %       Physical Exam   Constitutional: He is oriented to person, place, and time. No distress.   Alert   HENT:   Head: Normocephalic and atraumatic.   Mouth/Throat: Oropharynx is clear and moist.   Eyes: Pupils are equal, round, and reactive to light. Conjunctivae and EOM are normal. No scleral icterus.   Neck: Neck supple.   Trachea midline   Cardiovascular: Regular rhythm and normal heart sounds. Exam reveals no gallop.   No murmur heard.  Normal peripheral perfusion. Tachycardic but regular   Pulmonary/Chest: Effort normal and breath sounds normal. No respiratory distress. He has no wheezes. He has no rhonchi. He has no rales.   Abdominal: Soft. He exhibits no distension. There is no tenderness. There is no rebound and no guarding. Musculoskeletal: Normal range of motion.         General: No deformity.     Neurological: He is alert and oriented to person, place, and time. He has normal motor skills.   Normal Speech. No focal neruo deficits.   Skin: Skin is warm and dry. No rash noted.   Psychiatric: Mood and affect normal.   Cooperative.    Nursing note and vitals reviewed.         ECG Read Date: 12/23/19  ECG Read Time: 09:40  Interpretation: NOT A STEMI  Comment: 113 bpm. Sinus tachycardia. Left axis deviation. Poor R wave progression. Non specific ST/ T changes.  ms.  ms.  ms. QTc 510 ms. Minimal changed compared to previous November 26 2019.      ECG Read Date: 12/23/19  ECG Read Time: 13:47  Interpretation: NOT A STEMI  Comment: 82 bpm. Sinus rhythm with occasional premature ventricular complexes.  ms.  ms.  ms. QTc 511 ms. No change compared to previous November 2019.      Labwork:    Results for orders placed or performed during the hospital encounter of 12/23/19   CBC with Automated Differential   Result Value    WBC 6.0    RBC 2.87 (L)    HGB 9.4 (L)    HCT 28.8 (L)    .3 (H)    MCH 32.8    MCHC 32.6    RDW-CV 14.3        NRBC 0    DIFF TYPE  AUTOMATED DIFFERENTIAL    Neutrophil 78    LYMPH 9    MONO 11    EOSIN 1    BASO 0    Percent Immature Granuloctyes 1    Absolute Neutrophil 4.8    Absolute Lymph 0.5 (L)    Absolute Mono 0.6    Absolute Eos 0.1    Absolute Baso 0.0    Absolute Immature Granulocytes 0.0   Prothrombin Time   Result Value    PROTIME 11.1    INR 1.1     Comment: INR Therapeutic Range: 2.0 to 3.0 (2.5 to 3.5 recommended for recurrent thrombotic episodes and mechanical prosthetic heart valves.)    Partial Thromboplastin Time   Result Value    PTT 27     Comment: PTT  Therapeutic Range:  45-70 seconds.   COMPREHENSIVE METABOLIC PANEL   Result Value    Sodium 134 (L)    Potassium 4.5    Chloride 100    Carbon Dioxide 23    Anion Gap 16    Glucose 269 (H)    BUN 23 (H)    Creatinine 1.21 (H)    GFR Estimate,  61     Comment: eGFR 60 - 89 mL/min/1.73m2 = Mild decrease in kidney function.    GFR Estimate, Non  52     Comment: eGFR 30-59 mL/min/1.73m2 = Moderate decrease in kidney function. Stage 3 CKD (chronic kidney disease) or moderate kidney disease.    BUN/Creatinine Ratio 19    CALCIUM 8.5    TOTAL BILIRUBIN 0.3    AST/SGOT 20    ALT/SGPT 30    ALK PHOSPHATASE 77    TOTAL PROTEIN 7.0    Albumin 2.8 (L)    GLOBULIN 4.2 (H)    A/G Ratio, Serum 0.7 (L)   LACTIC ACID, VENOUS   Result Value    Lactic Acid Venous 1.9   Magnesium   Result Value    MAGNESIUM 1.3 (L)   Troponin I Ultra Sensitive   Result Value    TROPONIN I <0.02   URINALYSIS WITH MICRO & CULTURE IF INDICATED   Result Value    COLOR YELLOW    APPEARANCE HAZY    GLUCOSE(URINE) NEGATIVE    BILIRUBIN NEGATIVE    KETONES NEGATIVE    SPECIFIC GRAVITY 1.020    BLOOD LARGE (A)    pH 6.5    PROTEIN(URINE) 30 (A)    UROBILINOGEN 0.2    NITRITE NEGATIVE    LEUKOCYTE ESTERASE MODERATE (A)     Comment: Culture indicated, results to follow.    Squamous EPI'S 1 to 5    RBC >100 (H)    WBC 26 to 100     Comment: Culture indicated, results to follow.    BACTERIA  Speech normal.         Behavior: Behavior normal. Behavior is cooperative.         Thought Content: Thought content normal.         Cognition and Memory: Cognition and memory normal.         Judgment: Judgment normal.        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     NONE SEEN    Hyaline Casts NONE SEEN    SPECIMEN TYPE URINE CLEAN CATCH    TRANSITIONAL EPI'S 1 to 5    RENAL EPI'S 1 to 5   Troponin I Ultra Sensitive   Result Value    TROPONIN I <0.02        Imaging Results    None           Medications   magnesium sulfate 2 g in 50 mL premix IVPB (2 g Intravenous New Bag 12/23/19 1333)   sodium chloride 0.9 % flush bag 25 mL (has no administration in time range)   sodium chloride (PF) 0.9 % injection 2 mL (has no administration in time range)   enoxaparin (LOVENOX) injection 40 mg (has no administration in time range)   sodium chloride 0.9 % with KCl 20 mEq infusion (has no administration in time range)   cefTRIAXone (ROCEPHIN) syringe 1,000 mg (1,000 mg Intravenous Given 12/23/19 1237)   sodium chloride (NORMAL SALINE) 0.9 % bolus 1,000 mL (1,000 mLs Intravenous New Bag 12/23/19 1237)       ED Course as of Dec 23 1520   Mon Dec 23, 2019   1023 Patient had 2 episodes this morning where he said he felt weak.  When he got out of bed to go to the bathroom, and then when he was going from breakfast back to his room apparently.  He says it was weak he did not feel like he was going to pass out however run sheet states this with the caregiver said.  There was no loss of consciousness.  Denies chest pain.  States maybe had some shortness of breath.  Feels fine now.  Recent hospitalization for hematuria was stable but at that time he also had some cardiac arrhythmias.  Currently in his paced atrial tachycardia.  Normal neuro.  Normal color normal blood pressure.  No fever vomiting diarrhea.      1304 Phone call with PMD Dr. Moulton who agrees with plan to admit      1310 Patient's work-up shows his weakness near syncope to be multifactorial including orthostatic hypotension, urinary tract infection, and low magnesium.  Patient has had orthostatic hypotension they have been working on it but the drop is been more significant today.  Patient will be admitted for treatment.  Rechecked him  his vitals are stable he is comfortable his mental status is normal his color is good.  Discussed with his primary.      1324 Phone call with Dr. Contreras who agrees with plan to admit           Vitals:    12/23/19 1056 12/23/19 1140 12/23/19 1410 12/23/19 1411   BP: 146/85 152/81  168/82   Pulse: 87 87  82   Resp: 23 18 16   Temp:    96.8 °F (36 °C)   SpO2:    98%   Weight:   71.1 kg (156 lb 12 oz)    Height:   5' 11\" (1.803 m)          ED Diagnoses        Final diagnoses    Urinary tract infection without hematuria, site unspecified          Magnesium deficiency          Orthostatic hypotension                 DISPOSITION: ADMIT    Maritza Mesa is acting as a medical scribe for ED Clinician Dr. Briggs on 12/23/2019   This documentation accurately reflects the work and decisions made by me, Eugenio Briggs DO.     Eugenio Briggs DO  12/23/19 1524

## 2024-08-06 NOTE — PATIENT INSTRUCTIONS
pressure, or a strange feeling in the back, neck, jaw, or upper belly or in one or both shoulders or arms.     Lightheadedness or sudden weakness.     A fast or irregular heartbeat.   After you call 911, the  may tell you to chew 1 adult-strength or 2 to 4 low-dose aspirin. Wait for an ambulance. Do not try to drive yourself.  Watch closely for changes in your health, and be sure to contact your doctor if you have any problems.  Where can you learn more?  Go to https://www.[employer]/patientEd and enter F075 to learn more about \"A Healthy Heart: Care Instructions.\"  Current as of: June 24, 2023  Content Version: 14.1  © 2626-3410 [employer].   Care instructions adapted under license by [employer]. If you have questions about a medical condition or this instruction, always ask your healthcare professional. [employer] disclaims any warranty or liability for your use of this information.      Personalized Preventive Plan for John Rueda - 8/6/2024  Medicare offers a range of preventive health benefits. Some of the tests and screenings are paid in full while other may be subject to a deductible, co-insurance, and/or copay.    Some of these benefits include a comprehensive review of your medical history including lifestyle, illnesses that may run in your family, and various assessments and screenings as appropriate.    After reviewing your medical record and screening and assessments performed today your provider may have ordered immunizations, labs, imaging, and/or referrals for you.  A list of these orders (if applicable) as well as your Preventive Care list are included within your After Visit Summary for your review.    Other Preventive Recommendations:    A preventive eye exam performed by an eye specialist is recommended every 1-2 years to screen for glaucoma; cataracts, macular degeneration, and other eye disorders.  A preventive dental visit is recommended every 6

## 2024-08-08 ENCOUNTER — HOSPITAL ENCOUNTER (OUTPATIENT)
Age: 74
Discharge: HOME | End: 2024-08-08
Payer: COMMERCIAL

## 2024-08-08 DIAGNOSIS — R00.1 BRADYCARDIA ON ECG: ICD-10-CM

## 2024-08-08 PROCEDURE — 93242 EXT ECG>48HR<7D RECORDING: CPT

## 2024-08-16 ENCOUNTER — OFFICE VISIT (OUTPATIENT)
Dept: ENT CLINIC | Age: 74
End: 2024-08-16
Payer: COMMERCIAL

## 2024-08-16 VITALS
BODY MASS INDEX: 20 KG/M2 | HEIGHT: 68 IN | DIASTOLIC BLOOD PRESSURE: 80 MMHG | SYSTOLIC BLOOD PRESSURE: 118 MMHG | WEIGHT: 132 LBS

## 2024-08-16 DIAGNOSIS — E21.3 HYPERPARATHYROIDISM (HCC): ICD-10-CM

## 2024-08-16 DIAGNOSIS — E07.9 THYROID MASS: ICD-10-CM

## 2024-08-16 DIAGNOSIS — R26.81 UNSTEADINESS: ICD-10-CM

## 2024-08-16 DIAGNOSIS — H61.22 IMPACTED CERUMEN, LEFT EAR: Primary | ICD-10-CM

## 2024-08-16 PROCEDURE — 99203 OFFICE O/P NEW LOW 30 MIN: CPT | Performed by: NURSE PRACTITIONER

## 2024-08-16 PROCEDURE — 1123F ACP DISCUSS/DSCN MKR DOCD: CPT | Performed by: NURSE PRACTITIONER

## 2024-08-16 RX ORDER — MECLIZINE HCL 12.5 MG/1
12.5 TABLET ORAL 3 TIMES DAILY PRN
Qty: 30 TABLET | Refills: 1 | Status: SHIPPED | OUTPATIENT
Start: 2024-08-16 | End: 2024-09-05

## 2024-08-16 ASSESSMENT — ENCOUNTER SYMPTOMS
ALLERGIC/IMMUNOLOGIC NEGATIVE: 1
GASTROINTESTINAL NEGATIVE: 1
EYES NEGATIVE: 1
RESPIRATORY NEGATIVE: 1

## 2024-08-16 NOTE — PROGRESS NOTES
2024    John Rueda (:  1950) is a 74 y.o. male, Established patient, here for evaluation of the following chief complaint(s):  New Patient (Parathyroid )      Vitals:    24 0925   BP: 118/80   Weight: 59.9 kg (132 lb)   Height: 1.715 m (5' 7.52\")       Wt Readings from Last 3 Encounters:   24 59.9 kg (132 lb)   24 60.3 kg (133 lb)   24 60.3 kg (133 lb)       BP Readings from Last 3 Encounters:   24 118/80   24 118/64   24 118/64         SUBJECTIVE/OBJECTIVE:    Patient seen today for his thyroid and his left ear.  Patient states that about a week to 10 days ago he was having diminished hearing in his left ear.  He states that he cleaned his ear and his hearing is back to normal.  He reports a history of ear infections in his left ear.  He denies ear pain, aural fullness, muffled hearing, tinnitus, or drainage.  He had initially gone to his doctor due to some bowel issues.  He is followed by GI.  He states that they did lab work that showed an elevated calcium level.  He states the bowel issues have mostly resolved.  He had a CT scan of his neck ordered by his primary care provider back in May that showed \"2.8 cm enhancing partially necrotic nodule along posterior aspect of the thyroid gland right lobe.  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  Could also represent an exophytic thyroid nodule or necrotic lymph node.\"  His parathyroid hormone level and calcium levels were elevated on .  He denies difficulty swallowing or breathing.  He denies pain in the area.  He denies bone pain or kidney stones.  He denies fevers or chills.  He does report that he has been slowly losing weight over the last year.  He reports he is lost 8 to 9 pounds in the last year.  He states that he does not eat much because he does not have an appetite.  He reports that he had a colonoscopy at the beginning of July.  He also notes that he has felt off balance for the

## 2024-08-19 ENCOUNTER — HOSPITAL ENCOUNTER (OUTPATIENT)
Dept: GENERAL RADIOLOGY | Age: 74
Discharge: HOME | End: 2024-08-19
Payer: COMMERCIAL

## 2024-08-19 DIAGNOSIS — E07.9 THYROID MASS: ICD-10-CM

## 2024-08-19 DIAGNOSIS — E21.3 HYPERPARATHYROIDISM (HCC): ICD-10-CM

## 2024-08-19 PROCEDURE — 76536 US EXAM OF HEAD AND NECK: CPT

## 2024-08-23 ENCOUNTER — OFFICE VISIT (OUTPATIENT)
Dept: ENT CLINIC | Age: 74
End: 2024-08-23

## 2024-08-23 VITALS
DIASTOLIC BLOOD PRESSURE: 80 MMHG | HEIGHT: 68 IN | BODY MASS INDEX: 20.16 KG/M2 | SYSTOLIC BLOOD PRESSURE: 120 MMHG | WEIGHT: 133 LBS

## 2024-08-23 DIAGNOSIS — H61.22 IMPACTED CERUMEN, LEFT EAR: Primary | ICD-10-CM

## 2024-08-23 ASSESSMENT — ENCOUNTER SYMPTOMS
EYES NEGATIVE: 1
ALLERGIC/IMMUNOLOGIC NEGATIVE: 1
RESPIRATORY NEGATIVE: 1
GASTROINTESTINAL NEGATIVE: 1

## 2024-08-23 NOTE — PROGRESS NOTES
2024    John Rueda (:  1950) is a 74 y.o. male, Established patient, here for evaluation of the following chief complaint(s):  Follow-up (Cerumen )      Vitals:    24 0857   BP: 120/80   Weight: 60.3 kg (133 lb)   Height: 1.715 m (5' 7.52\")       Wt Readings from Last 3 Encounters:   24 60.3 kg (133 lb)   24 59.9 kg (132 lb)   24 60.3 kg (133 lb)       BP Readings from Last 3 Encounters:   24 120/80   24 118/80   24 118/64         SUBJECTIVE/OBJECTIVE:    Patient seen today for follow up of cerumen. He was started on Debrox for his left ear at his last visit. He states he got a lot of wax out of his left ear with the drops. He denies ear pain, hearing changes, or aural fullness.        Review of Systems   Constitutional: Negative.    HENT: Negative.     Eyes: Negative.    Respiratory: Negative.     Cardiovascular: Negative.    Gastrointestinal: Negative.    Endocrine: Negative.    Musculoskeletal: Negative.    Skin: Negative.    Allergic/Immunologic: Negative.    Neurological: Negative.    Hematological: Negative.    Psychiatric/Behavioral: Negative.          Physical Exam  Vitals reviewed.   Constitutional:       Appearance: Normal appearance. He is normal weight.   HENT:      Head: Normocephalic and atraumatic.      Right Ear: Tympanic membrane, ear canal and external ear normal.      Left Ear: Tympanic membrane, ear canal and external ear normal.      Nose: Nose normal.      Mouth/Throat:      Mouth: Mucous membranes are moist.      Pharynx: Oropharynx is clear.   Eyes:      Extraocular Movements: Extraocular movements intact.      Pupils: Pupils are equal, round, and reactive to light.   Pulmonary:      Effort: Pulmonary effort is normal.   Musculoskeletal:      Cervical back: Normal range of motion.   Skin:     General: Skin is warm and dry.   Neurological:      General: No focal deficit present.      Mental Status: He is alert and oriented to

## 2024-08-28 ENCOUNTER — TELEPHONE (OUTPATIENT)
Dept: ENT CLINIC | Age: 74
End: 2024-08-28

## 2024-08-28 ENCOUNTER — HOSPITAL ENCOUNTER (OUTPATIENT)
Dept: NUCLEAR MEDICINE | Age: 74
Discharge: HOME | End: 2024-08-30
Payer: COMMERCIAL

## 2024-08-28 DIAGNOSIS — D35.1 PARATHYROID ADENOMA: Primary | ICD-10-CM

## 2024-08-28 PROCEDURE — A9500 TC99M SESTAMIBI: HCPCS | Performed by: NURSE PRACTITIONER

## 2024-08-28 PROCEDURE — 78070 PARATHYROID PLANAR IMAGING: CPT

## 2024-08-28 PROCEDURE — 3430000000 HC RX DIAGNOSTIC RADIOPHARMACEUTICAL: Performed by: NURSE PRACTITIONER

## 2024-08-28 RX ORDER — TETRAKIS(2-METHOXYISOBUTYLISOCYANIDE)COPPER(I) TETRAFLUOROBORATE 1 MG/ML
20 INJECTION, POWDER, LYOPHILIZED, FOR SOLUTION INTRAVENOUS
Status: COMPLETED | OUTPATIENT
Start: 2024-08-28 | End: 2024-08-28

## 2024-08-28 RX ADMIN — TETRAKIS(2-METHOXYISOBUTYLISOCYANIDE)COPPER(I) TETRAFLUOROBORATE 20 MILLICURIE: 1 INJECTION, POWDER, LYOPHILIZED, FOR SOLUTION INTRAVENOUS at 13:10

## 2024-08-29 ENCOUNTER — OFFICE VISIT (OUTPATIENT)
Dept: FAMILY MEDICINE CLINIC | Age: 74
End: 2024-08-29
Payer: COMMERCIAL

## 2024-08-29 VITALS
BODY MASS INDEX: 20 KG/M2 | HEART RATE: 55 BPM | OXYGEN SATURATION: 98 % | WEIGHT: 132 LBS | DIASTOLIC BLOOD PRESSURE: 68 MMHG | HEIGHT: 68 IN | TEMPERATURE: 97.5 F | SYSTOLIC BLOOD PRESSURE: 116 MMHG

## 2024-08-29 DIAGNOSIS — E21.5 PARATHYROID ABNORMALITY (HCC): Primary | ICD-10-CM

## 2024-08-29 DIAGNOSIS — E83.52 HYPERCALCEMIA: ICD-10-CM

## 2024-08-29 PROCEDURE — 4004F PT TOBACCO SCREEN RCVD TLK: CPT | Performed by: NURSE PRACTITIONER

## 2024-08-29 PROCEDURE — 3017F COLORECTAL CA SCREEN DOC REV: CPT | Performed by: NURSE PRACTITIONER

## 2024-08-29 PROCEDURE — G8427 DOCREV CUR MEDS BY ELIG CLIN: HCPCS | Performed by: NURSE PRACTITIONER

## 2024-08-29 PROCEDURE — 99213 OFFICE O/P EST LOW 20 MIN: CPT | Performed by: NURSE PRACTITIONER

## 2024-08-29 PROCEDURE — 1123F ACP DISCUSS/DSCN MKR DOCD: CPT | Performed by: NURSE PRACTITIONER

## 2024-08-29 PROCEDURE — G8420 CALC BMI NORM PARAMETERS: HCPCS | Performed by: NURSE PRACTITIONER

## 2024-08-29 NOTE — PROGRESS NOTES
SUBJECTIVE:  FMLA   Patient ID: John Rueda is a 74 y.o. male.    HPI:   HPI   States he is going to go back to work Was contemplating retiring but has decided needs to return to work.   Wanting to discuss results ENT wanting him to go to [place].   ZIO 3 days was acceptable but has a new one turned in yesterday pending results.   We did spend some time talking about his results of the parathyroid ENT is wanting him to go to [place] to explore this further I think he is going to follow through with that he also has another ZIO that was worn that he just sent in late yesterday.  He is found that he does better working not retiring he misses interaction so he is decided to go back to work    Past Medical History:   Diagnosis Date    ED (erectile dysfunction)     GERD (gastroesophageal reflux disease)     Seizures (HCC)       Prior to Visit Medications    Medication Sig Taking? Authorizing Provider   meclizine (ANTIVERT) 12.5 MG tablet Take 1 tablet by mouth 3 times daily as needed for Dizziness  Patient not taking: Reported on 8/29/2024  Danitza Mcghee, [facility] - CNP      No Known Allergies    Review of Systems   Cardiovascular:  Positive for palpitations. Negative for chest pain.   Psychiatric/Behavioral:  Negative for dysphoric mood. The patient is not nervous/anxious.        OBJECTIVE:    Physical Exam  Constitutional:       Appearance: Normal appearance. He is well-developed. He is not ill-appearing.   HENT:      Head: Normocephalic and atraumatic.      Right Ear: External ear normal.      Left Ear: External ear normal.      Nose: Nose normal.      Mouth/Throat:      Lips: Pink.      Mouth: Mucous membranes are moist.   Eyes:      General: Lids are normal.      Extraocular Movements: Extraocular movements intact.      Conjunctiva/sclera: Conjunctivae normal.   Cardiovascular:      Rate and Rhythm: Normal rate and regular rhythm.      Heart sounds: Normal heart sounds. No murmur heard.  Pulmonary:      Effort:  Pulmonary effort is normal.      Breath sounds: Normal breath sounds.   Musculoskeletal:      Cervical back: Normal range of motion.   Skin:     General: Skin is warm and dry.   Neurological:      Mental Status: He is alert and oriented to person, place, and time.      Motor: No weakness or tremor.      Coordination: Coordination is intact.   Psychiatric:         Attention and Perception: Attention and perception normal.         Mood and Affect: Mood normal.         Speech: Speech normal.         Behavior: Behavior normal. Behavior is cooperative.         Thought Content: Thought content normal.         Cognition and Memory: Cognition and memory normal.         Judgment: Judgment normal.        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 Hypercalcemia  E83.52           PLAN:    John Rueda: release to work  (Needing a letter that he can return to work. They didn't give him any papers that they specifically need. Last visit fmla was discussed return to work is 09/06. ) and Other (Discuss test ordered for thyroid and heart monitor. )  I have asked him to keep me updated on future office visits with [facility]    Diagnosis and orders for this visit are above.

## 2024-10-14 ENCOUNTER — TELEPHONE (OUTPATIENT)
Dept: ENT CLINIC | Age: 74
End: 2024-10-14

## 2024-10-14 NOTE — TELEPHONE ENCOUNTER
Patient is requesting a return call from the office. Patient had Thyroid surgery on Friday with Dr. Will. He ask if he needs to have lab work prior to his appt on 10/25 to make sure surgery was a success. Please advise.    Thank you!

## 2024-10-25 ENCOUNTER — OFFICE VISIT (OUTPATIENT)
Dept: ENT CLINIC | Age: 74
End: 2024-10-25

## 2024-10-25 ENCOUNTER — TELEPHONE (OUTPATIENT)
Dept: ENT CLINIC | Age: 74
End: 2024-10-25

## 2024-10-25 VITALS
WEIGHT: 138 LBS | BODY MASS INDEX: 20.92 KG/M2 | HEIGHT: 68 IN | DIASTOLIC BLOOD PRESSURE: 82 MMHG | SYSTOLIC BLOOD PRESSURE: 124 MMHG

## 2024-10-25 DIAGNOSIS — Z90.89 STATUS POST PARATHYROIDECTOMY: Primary | ICD-10-CM

## 2024-10-25 DIAGNOSIS — Z98.890 STATUS POST PARATHYROIDECTOMY: Primary | ICD-10-CM

## 2024-10-25 ASSESSMENT — ENCOUNTER SYMPTOMS
EYES NEGATIVE: 1
RESPIRATORY NEGATIVE: 1
ALLERGIC/IMMUNOLOGIC NEGATIVE: 1
GASTROINTESTINAL NEGATIVE: 1

## 2024-10-25 NOTE — PROGRESS NOTES
10/25/2024    John Rueda (:  1950) is a 74 y.o. male, Established patient, here for evaluation of the following chief complaint(s):  Follow-up (Thyroid )      Vitals:    10/25/24 1041   BP: 124/82   Weight: 62.6 kg (138 lb)   Height: 1.715 m (5' 7.52\")       Wt Readings from Last 3 Encounters:   10/25/24 62.6 kg (138 lb)   24 59.9 kg (132 lb)   24 60.3 kg (133 lb)       BP Readings from Last 3 Encounters:   10/25/24 124/82   24 116/68   24 120/80         SUBJECTIVE/OBJECTIVE:    Patient seen today for follow up after having a parathyroidectomy on 10/11/24 by Dr. Castro. He states that he is doing well after surgery. He denies pain, difficulty swallowing, or difficulty breathing. Patient states that he was called 2 days ago and was told that his parathyroid that was removed was not cancer. He was previously given meclizine for unsteadiness, but he states he has not had any balance issues.         Review of Systems   Constitutional: Negative.    HENT: Negative.     Eyes: Negative.    Respiratory: Negative.     Cardiovascular: Negative.    Gastrointestinal: Negative.    Endocrine: Negative.    Musculoskeletal: Negative.    Skin: Negative.    Allergic/Immunologic: Negative.    Neurological: Negative.    Hematological: Negative.    Psychiatric/Behavioral: Negative.          Physical Exam  Vitals reviewed.   Constitutional:       Appearance: Normal appearance. He is normal weight.   HENT:      Head: Normocephalic and atraumatic.      Right Ear: Tympanic membrane, ear canal and external ear normal.      Left Ear: Tympanic membrane, ear canal and external ear normal.      Nose: Nose normal.      Mouth/Throat:      Mouth: Mucous membranes are moist.      Pharynx: Oropharynx is clear.   Eyes:      Extraocular Movements: Extraocular movements intact.      Pupils: Pupils are equal, round, and reactive to light.   Neck:        Comments: Incision well approximated, no erythema, edema, or

## (undated) DEVICE — PLATE ES AD W 9FT CRD 2

## (undated) DEVICE — BRUSH ENDOSCP 2 END CHN HEDGEHOG

## (undated) DEVICE — ELECTRD BLD EZ CLN MOD XLNG 2.75IN

## (undated) DEVICE — COLON KIT WITH 1.1 OZ ORCA HYDRA SEAL 2 GOWN

## (undated) DEVICE — ADAPTER CLEANING PORPOISE CLEANING

## (undated) DEVICE — DRSNG SURESITE WNDW 4X4.5

## (undated) DEVICE — SKIN AFFIX SURG ADHESIVE 72/CS 0.55ML: Brand: MEDLINE

## (undated) DEVICE — GLV SURG DERMASSURE GRN LF PF 7.0

## (undated) DEVICE — NEEDLE, QUINCKE, 18GX3.5": Brand: MEDLINE

## (undated) DEVICE — CANNULA NSL AD L7FT DIV O2 CO2 W/ M LUERLOCK TRMPT CONN

## (undated) DEVICE — SUT MNCRYL 4/0 PS2 27IN UD MCP426H

## (undated) DEVICE — SPNG DISSCT CHRRY 3/8IN STRL PK/5

## (undated) DEVICE — DISSECTOR ENDOSCP PREPERI KID SHP DISTENSION BLLN SPCMKR

## (undated) DEVICE — SUTURE MCRYL SZ 4-0 L18IN ABSRB UD L19MM PS-2 3/8 CIR PRIM Y496G

## (undated) DEVICE — SINGLE PORT MANIFOLD: Brand: NEPTUNE 2

## (undated) DEVICE — MASK ANES AD M SZ 5 PREM TAIL VLV INFL PRT UNSCENTED HK RNG

## (undated) DEVICE — INSUFFLATION TUBING,LAPAROSCOPIC: Brand: DEROYAL

## (undated) DEVICE — CIRCUIT AD PLN SWVL WYE

## (undated) DEVICE — 1.7MM PRECISION NEURO (MATCH HEAD)

## (undated) DEVICE — PACK,UNIVERSAL,NO GOWNS: Brand: MEDLINE

## (undated) DEVICE — STERILE POLYISOPRENE POWDER-FREE SURGICAL GLOVES: Brand: PROTEXIS

## (undated) DEVICE — PK SPINE CERV ANT 30

## (undated) DEVICE — CLEANING SPONGE: Brand: KOALA™

## (undated) DEVICE — ANTIBACTERIAL UNDYED BRAIDED (POLYGLACTIN 910), SYNTHETIC ABSORBABLE SUTURE: Brand: COATED VICRYL

## (undated) DEVICE — APPL CHLORAPREP HI/LITE 26ML ORNG

## (undated) DEVICE — HALTR TRACT HD CERV STD UNIV

## (undated) DEVICE — SUTURE VCRL SZ 0 L27IN ABSRB VLT L36MM UR-5 5/8 CIR J376H

## (undated) DEVICE — DRAPE,LAP,CHOLE,W/TROUGHS,STERILE: Brand: MEDLINE

## (undated) DEVICE — STAPLER INT DIA5MM 25 ABSRB STRP FIX DISP FOR HERN MESH

## (undated) DEVICE — ADHESIVE SKIN CLSR 0.7ML TOP DERMBND ADV

## (undated) DEVICE — CHLORAPREP 26ML ORANGE

## (undated) DEVICE — DRP C/ARMOR

## (undated) DEVICE — CATH IV ANGIO FEP 12G 3IN LTBLU 10PK

## (undated) DEVICE — ELECTROSURGICAL PENCIL BUTTON SWITCH NON COATED BLADE ELECTRODE 10 FT (3 M) CORD HOLSTER: Brand: MEGADYNE

## (undated) DEVICE — DEFOGGER!" ANTI FOG KIT: Brand: DEROYAL

## (undated) DEVICE — PROXIMATE RH ROTATING HEAD SKIN STAPLERS (35 WIDE) CONTAINS 35 STAINLESS STEEL STAPLES: Brand: PROXIMATE

## (undated) DEVICE — GLV SURG GRN DERMASSURE LF PF 7.5

## (undated) DEVICE — TROCAR ENDOSCP L100MM DIA10MM STRUCTURAL BLLN FOR LAP

## (undated) DEVICE — SPONGE,NEURO,0.5"X3",XR,STRL,LF,10/PK: Brand: MEDLINE

## (undated) DEVICE — PK TURNOVER RM ADV

## (undated) DEVICE — CLTH CLENS READYCLEANSE PERI CARE PK/5

## (undated) DEVICE — SUPPLEMENT DIGESTIVE H2O SOL GI-EASE

## (undated) DEVICE — GLV SURG SENSICARE W/ALOE PF LF 7.5 STRL

## (undated) DEVICE — CVR UNIV C/ARM

## (undated) DEVICE — GLV SURG BIOGEL LTX PF 6 1/2

## (undated) DEVICE — MAJOR BSIN SETUP PK

## (undated) DEVICE — 3.0MM PRECISION NEURO (MATCH HEAD)